# Patient Record
Sex: FEMALE | Race: WHITE | NOT HISPANIC OR LATINO | Employment: OTHER | URBAN - METROPOLITAN AREA
[De-identification: names, ages, dates, MRNs, and addresses within clinical notes are randomized per-mention and may not be internally consistent; named-entity substitution may affect disease eponyms.]

---

## 2017-05-23 DIAGNOSIS — D64.9 ANEMIA: ICD-10-CM

## 2017-05-23 DIAGNOSIS — D50.9 IRON DEFICIENCY ANEMIA: ICD-10-CM

## 2017-05-23 DIAGNOSIS — E53.8 DEFICIENCY OF OTHER SPECIFIED B GROUP VITAMINS: ICD-10-CM

## 2017-05-23 DIAGNOSIS — M35.9 SYSTEMIC INVOLVEMENT OF CONNECTIVE TISSUE (HCC): ICD-10-CM

## 2017-05-23 DIAGNOSIS — R74.8 ABNORMAL LEVELS OF OTHER SERUM ENZYMES: ICD-10-CM

## 2017-05-28 ENCOUNTER — HOSPITAL ENCOUNTER (EMERGENCY)
Facility: HOSPITAL | Age: 47
Discharge: HOME | End: 2017-05-28
Attending: EMERGENCY MEDICINE | Admitting: EMERGENCY MEDICINE
Payer: MEDICARE

## 2017-05-28 ENCOUNTER — GENERIC CONVERSION - ENCOUNTER (OUTPATIENT)
Dept: OTHER | Facility: OTHER | Age: 47
End: 2017-05-28

## 2017-05-28 VITALS
OXYGEN SATURATION: 97 % | WEIGHT: 205 LBS | SYSTOLIC BLOOD PRESSURE: 104 MMHG | DIASTOLIC BLOOD PRESSURE: 57 MMHG | RESPIRATION RATE: 18 BRPM | HEART RATE: 82 BPM | TEMPERATURE: 97.5 F

## 2017-05-28 DIAGNOSIS — R55 NEUROCARDIOGENIC SYNCOPE: ICD-10-CM

## 2017-05-28 DIAGNOSIS — R51.9 HEADACHE: Primary | ICD-10-CM

## 2017-05-28 DIAGNOSIS — R11.0 NAUSEA: ICD-10-CM

## 2017-05-28 LAB
ANION GAP BLD CALC-SCNC: 19 MMOL/L (ref 4–13)
ANION GAP SERPL CALCULATED.3IONS-SCNC: 9 MMOL/L (ref 4–13)
ATRIAL RATE: 375 BPM
ATRIAL RATE: 75 BPM
BACTERIA UR QL AUTO: ABNORMAL /HPF
BASOPHILS # BLD AUTO: 0.02 THOUSANDS/ΜL (ref 0–0.1)
BASOPHILS NFR BLD AUTO: 0 % (ref 0–1)
BILIRUB UR QL STRIP: NEGATIVE
BUN BLD-MCNC: 13 MG/DL (ref 5–25)
BUN SERPL-MCNC: 13 MG/DL (ref 5–25)
CA-I BLD-SCNC: 1.08 MMOL/L (ref 1.12–1.32)
CALCIUM SERPL-MCNC: 8.8 MG/DL (ref 8.3–10.1)
CHLORIDE BLD-SCNC: 102 MMOL/L (ref 100–108)
CHLORIDE SERPL-SCNC: 103 MMOL/L (ref 100–108)
CLARITY UR: CLEAR
CO2 SERPL-SCNC: 25 MMOL/L (ref 21–32)
COLOR UR: YELLOW
CREAT BLD-MCNC: 1 MG/DL (ref 0.6–1.3)
CREAT SERPL-MCNC: 1.12 MG/DL (ref 0.6–1.3)
EOSINOPHIL # BLD AUTO: 0.06 THOUSAND/ΜL (ref 0–0.61)
EOSINOPHIL NFR BLD AUTO: 1 % (ref 0–6)
ERYTHROCYTE [DISTWIDTH] IN BLOOD BY AUTOMATED COUNT: 14.6 % (ref 11.6–15.1)
GFR SERPL CREATININE-BSD FRML MDRD: 51.7 ML/MIN/1.73SQ M
GFR SERPL CREATININE-BSD FRML MDRD: 58.9 ML/MIN/1.73SQ M
GLUCOSE SERPL-MCNC: 151 MG/DL (ref 65–140)
GLUCOSE SERPL-MCNC: 154 MG/DL (ref 65–140)
GLUCOSE UR STRIP-MCNC: NEGATIVE MG/DL
HCT VFR BLD AUTO: 35.2 % (ref 34.8–46.1)
HCT VFR BLD CALC: 36 % (ref 34.8–46.1)
HGB BLD-MCNC: 11.5 G/DL (ref 11.5–15.4)
HGB BLDA-MCNC: 12.2 G/DL (ref 11.5–15.4)
HGB UR QL STRIP.AUTO: NEGATIVE
HYALINE CASTS #/AREA URNS LPF: ABNORMAL /LPF
KETONES UR STRIP-MCNC: NEGATIVE MG/DL
LEUKOCYTE ESTERASE UR QL STRIP: ABNORMAL
LYMPHOCYTES # BLD AUTO: 0.98 THOUSANDS/ΜL (ref 0.6–4.47)
LYMPHOCYTES NFR BLD AUTO: 16 % (ref 14–44)
MCH RBC QN AUTO: 27.9 PG (ref 26.8–34.3)
MCHC RBC AUTO-ENTMCNC: 32.7 G/DL (ref 31.4–37.4)
MCV RBC AUTO: 85 FL (ref 82–98)
MONOCYTES # BLD AUTO: 0.43 THOUSAND/ΜL (ref 0.17–1.22)
MONOCYTES NFR BLD AUTO: 7 % (ref 4–12)
NEUTROPHILS # BLD AUTO: 4.79 THOUSANDS/ΜL (ref 1.85–7.62)
NEUTS SEG NFR BLD AUTO: 76 % (ref 43–75)
NITRITE UR QL STRIP: NEGATIVE
NON-SQ EPI CELLS URNS QL MICRO: ABNORMAL /HPF
NRBC BLD AUTO-RTO: 0 /100 WBCS
P AXIS: 81 DEGREES
PCO2 BLD: 22 MMOL/L (ref 21–32)
PH UR STRIP.AUTO: 6 [PH] (ref 4.5–8)
PLATELET # BLD AUTO: 311 THOUSANDS/UL (ref 149–390)
PMV BLD AUTO: 10.2 FL (ref 8.9–12.7)
POTASSIUM BLD-SCNC: 4.1 MMOL/L (ref 3.5–5.3)
POTASSIUM SERPL-SCNC: 4 MMOL/L (ref 3.5–5.3)
PR INTERVAL: 148 MS
PROT UR STRIP-MCNC: NEGATIVE MG/DL
QRS AXIS: 30 DEGREES
QRS AXIS: 9 DEGREES
QRSD INTERVAL: 74 MS
QRSD INTERVAL: 80 MS
QT INTERVAL: 342 MS
QT INTERVAL: 416 MS
QTC INTERVAL: 401 MS
QTC INTERVAL: 464 MS
RBC # BLD AUTO: 4.12 MILLION/UL (ref 3.81–5.12)
RBC #/AREA URNS AUTO: ABNORMAL /HPF
SODIUM BLD-SCNC: 138 MMOL/L (ref 136–145)
SODIUM SERPL-SCNC: 137 MMOL/L (ref 136–145)
SP GR UR STRIP.AUTO: 1.01 (ref 1–1.03)
SPECIMEN SOURCE: ABNORMAL
SPECIMEN SOURCE: NORMAL
T WAVE AXIS: 39 DEGREES
T WAVE AXIS: 75 DEGREES
TROPONIN I BLD-MCNC: 0 NG/ML (ref 0–0.08)
UROBILINOGEN UR QL STRIP.AUTO: 0.2 E.U./DL
VENTRICULAR RATE: 75 BPM
VENTRICULAR RATE: 83 BPM
WBC # BLD AUTO: 6.29 THOUSAND/UL (ref 4.31–10.16)
WBC #/AREA URNS AUTO: ABNORMAL /HPF

## 2017-05-28 PROCEDURE — 99284 EMERGENCY DEPT VISIT MOD MDM: CPT

## 2017-05-28 PROCEDURE — 96374 THER/PROPH/DIAG INJ IV PUSH: CPT

## 2017-05-28 PROCEDURE — 84484 ASSAY OF TROPONIN QUANT: CPT

## 2017-05-28 PROCEDURE — 81001 URINALYSIS AUTO W/SCOPE: CPT

## 2017-05-28 PROCEDURE — 80047 BASIC METABLC PNL IONIZED CA: CPT

## 2017-05-28 PROCEDURE — 93005 ELECTROCARDIOGRAM TRACING: CPT

## 2017-05-28 PROCEDURE — 36415 COLL VENOUS BLD VENIPUNCTURE: CPT | Performed by: EMERGENCY MEDICINE

## 2017-05-28 PROCEDURE — 85014 HEMATOCRIT: CPT

## 2017-05-28 PROCEDURE — 85025 COMPLETE CBC W/AUTO DIFF WBC: CPT | Performed by: EMERGENCY MEDICINE

## 2017-05-28 PROCEDURE — 96361 HYDRATE IV INFUSION ADD-ON: CPT

## 2017-05-28 PROCEDURE — 80048 BASIC METABOLIC PNL TOTAL CA: CPT | Performed by: EMERGENCY MEDICINE

## 2017-05-28 PROCEDURE — 96375 TX/PRO/DX INJ NEW DRUG ADDON: CPT

## 2017-05-28 RX ORDER — DIPHENHYDRAMINE HCL 12.5MG/5ML
25 LIQUID (ML) ORAL ONCE
Status: COMPLETED | OUTPATIENT
Start: 2017-05-28 | End: 2017-05-28

## 2017-05-28 RX ORDER — ONDANSETRON 2 MG/ML
4 INJECTION INTRAMUSCULAR; INTRAVENOUS ONCE
Status: COMPLETED | OUTPATIENT
Start: 2017-05-28 | End: 2017-05-28

## 2017-05-28 RX ORDER — ACETAMINOPHEN 325 MG/1
650 TABLET ORAL ONCE
Status: COMPLETED | OUTPATIENT
Start: 2017-05-28 | End: 2017-05-28

## 2017-05-28 RX ORDER — GARLIC 180 MG
40 TABLET, DELAYED RELEASE (ENTERIC COATED) ORAL DAILY
COMMUNITY
End: 2018-05-25 | Stop reason: HOSPADM

## 2017-05-28 RX ORDER — METOCLOPRAMIDE HYDROCHLORIDE 5 MG/ML
10 INJECTION INTRAMUSCULAR; INTRAVENOUS ONCE
Status: COMPLETED | OUTPATIENT
Start: 2017-05-28 | End: 2017-05-28

## 2017-05-28 RX ORDER — CYCLOBENZAPRINE HCL 10 MG
10 TABLET ORAL 3 TIMES DAILY PRN
COMMUNITY
End: 2018-05-08 | Stop reason: ALTCHOICE

## 2017-05-28 RX ORDER — KETOROLAC TROMETHAMINE 30 MG/ML
15 INJECTION, SOLUTION INTRAMUSCULAR; INTRAVENOUS ONCE
Status: COMPLETED | OUTPATIENT
Start: 2017-05-28 | End: 2017-05-28

## 2017-05-28 RX ADMIN — ONDANSETRON 4 MG: 2 INJECTION INTRAMUSCULAR; INTRAVENOUS at 00:46

## 2017-05-28 RX ADMIN — KETOROLAC TROMETHAMINE 15 MG: 30 INJECTION, SOLUTION INTRAMUSCULAR at 01:28

## 2017-05-28 RX ADMIN — SODIUM CHLORIDE 1000 ML: 0.9 INJECTION, SOLUTION INTRAVENOUS at 00:45

## 2017-05-28 RX ADMIN — METOCLOPRAMIDE 10 MG: 5 INJECTION, SOLUTION INTRAMUSCULAR; INTRAVENOUS at 01:28

## 2017-05-28 RX ADMIN — DIPHENHYDRAMINE HYDROCHLORIDE 25 MG: 12.5 SOLUTION ORAL at 01:28

## 2017-05-28 RX ADMIN — ACETAMINOPHEN 650 MG: 325 TABLET, FILM COATED ORAL at 00:46

## 2017-05-30 ENCOUNTER — ALLSCRIPTS OFFICE VISIT (OUTPATIENT)
Dept: OTHER | Facility: OTHER | Age: 47
End: 2017-05-30

## 2017-06-08 ENCOUNTER — HOSPITAL ENCOUNTER (OUTPATIENT)
Dept: INFUSION CENTER | Facility: HOSPITAL | Age: 47
Discharge: HOME | End: 2017-06-08
Payer: MEDICARE

## 2017-06-08 VITALS
SYSTOLIC BLOOD PRESSURE: 112 MMHG | TEMPERATURE: 99.1 F | WEIGHT: 229.5 LBS | RESPIRATION RATE: 20 BRPM | HEART RATE: 72 BPM | DIASTOLIC BLOOD PRESSURE: 64 MMHG | BODY MASS INDEX: 40.66 KG/M2 | HEIGHT: 63 IN

## 2017-06-08 PROCEDURE — 96365 THER/PROPH/DIAG IV INF INIT: CPT

## 2017-06-08 PROCEDURE — 96367 TX/PROPH/DG ADDL SEQ IV INF: CPT

## 2017-06-08 RX ORDER — SODIUM CHLORIDE 9 MG/ML
20 INJECTION, SOLUTION INTRAVENOUS ONCE
Status: COMPLETED | OUTPATIENT
Start: 2017-06-08 | End: 2017-06-08

## 2017-06-08 RX ADMIN — SODIUM CHLORIDE 20 ML/HR: 0.9 INJECTION, SOLUTION INTRAVENOUS at 14:25

## 2017-06-08 RX ADMIN — DIPHENHYDRAMINE HYDROCHLORIDE 25 MG: 50 INJECTION, SOLUTION INTRAMUSCULAR; INTRAVENOUS at 14:25

## 2017-06-08 RX ADMIN — IRON SUCROSE 200 MG: 20 INJECTION, SOLUTION INTRAVENOUS at 14:54

## 2017-06-14 RX ORDER — SODIUM CHLORIDE 9 MG/ML
20 INJECTION, SOLUTION INTRAVENOUS ONCE
Status: COMPLETED | OUTPATIENT
Start: 2017-06-15 | End: 2017-06-15

## 2017-06-15 ENCOUNTER — HOSPITAL ENCOUNTER (OUTPATIENT)
Dept: INFUSION CENTER | Facility: HOSPITAL | Age: 47
Discharge: HOME | End: 2017-06-15
Payer: MEDICARE

## 2017-06-15 VITALS
HEART RATE: 82 BPM | SYSTOLIC BLOOD PRESSURE: 135 MMHG | RESPIRATION RATE: 16 BRPM | TEMPERATURE: 98.3 F | DIASTOLIC BLOOD PRESSURE: 82 MMHG

## 2017-06-15 PROCEDURE — 96365 THER/PROPH/DIAG IV INF INIT: CPT

## 2017-06-15 PROCEDURE — 96367 TX/PROPH/DG ADDL SEQ IV INF: CPT

## 2017-06-15 RX ADMIN — DIPHENHYDRAMINE HYDROCHLORIDE 25 MG: 50 INJECTION, SOLUTION INTRAMUSCULAR; INTRAVENOUS at 14:23

## 2017-06-15 RX ADMIN — SODIUM CHLORIDE 20 ML/HR: 0.9 INJECTION, SOLUTION INTRAVENOUS at 14:23

## 2017-06-15 RX ADMIN — IRON SUCROSE 200 MG: 20 INJECTION, SOLUTION INTRAVENOUS at 14:54

## 2017-08-02 ENCOUNTER — ALLSCRIPTS OFFICE VISIT (OUTPATIENT)
Dept: OTHER | Facility: OTHER | Age: 47
End: 2017-08-02

## 2017-08-02 ENCOUNTER — TRANSCRIBE ORDERS (OUTPATIENT)
Dept: ADMINISTRATIVE | Facility: HOSPITAL | Age: 47
End: 2017-08-02

## 2017-08-02 DIAGNOSIS — M25.562 ACUTE PAIN OF LEFT KNEE: Primary | ICD-10-CM

## 2017-08-07 ENCOUNTER — HOSPITAL ENCOUNTER (OUTPATIENT)
Dept: RADIOLOGY | Facility: HOSPITAL | Age: 47
Discharge: HOME | End: 2017-08-07
Attending: ORTHOPAEDIC SURGERY
Payer: MEDICARE

## 2017-08-07 DIAGNOSIS — M25.562 ACUTE PAIN OF LEFT KNEE: ICD-10-CM

## 2017-08-07 PROCEDURE — 73721 MRI JNT OF LWR EXTRE W/O DYE: CPT

## 2017-08-09 ENCOUNTER — ALLSCRIPTS OFFICE VISIT (OUTPATIENT)
Dept: OTHER | Facility: OTHER | Age: 47
End: 2017-08-09

## 2017-08-09 ENCOUNTER — APPOINTMENT (OUTPATIENT)
Dept: LAB | Facility: HOSPITAL | Age: 47
End: 2017-08-09
Attending: ORTHOPAEDIC SURGERY
Payer: MEDICARE

## 2017-08-09 ENCOUNTER — TRANSCRIBE ORDERS (OUTPATIENT)
Dept: ADMINISTRATIVE | Facility: HOSPITAL | Age: 47
End: 2017-08-09

## 2017-08-09 DIAGNOSIS — M25.562 LEFT KNEE PAIN, UNSPECIFIED CHRONICITY: ICD-10-CM

## 2017-08-09 DIAGNOSIS — M25.562 LEFT KNEE PAIN, UNSPECIFIED CHRONICITY: Primary | ICD-10-CM

## 2017-08-09 LAB
ANION GAP SERPL CALCULATED.3IONS-SCNC: 8 MMOL/L (ref 4–13)
BASOPHILS # BLD AUTO: 0.02 THOUSANDS/ΜL (ref 0–0.1)
BASOPHILS NFR BLD AUTO: 1 % (ref 0–1)
BUN SERPL-MCNC: 12 MG/DL (ref 5–25)
CALCIUM SERPL-MCNC: 9.1 MG/DL (ref 8.3–10.1)
CHLORIDE SERPL-SCNC: 104 MMOL/L (ref 100–108)
CO2 SERPL-SCNC: 30 MMOL/L (ref 21–32)
CREAT SERPL-MCNC: 1 MG/DL (ref 0.6–1.3)
EOSINOPHIL # BLD AUTO: 0.1 THOUSAND/ΜL (ref 0–0.61)
EOSINOPHIL NFR BLD AUTO: 2 % (ref 0–6)
ERYTHROCYTE [DISTWIDTH] IN BLOOD BY AUTOMATED COUNT: 19.8 % (ref 11.6–15.1)
GFR SERPL CREATININE-BSD FRML MDRD: 66 ML/MIN/1.73SQ M
GLUCOSE P FAST SERPL-MCNC: 93 MG/DL (ref 65–99)
HCT VFR BLD AUTO: 42 % (ref 34.8–46.1)
HGB BLD-MCNC: 13.8 G/DL (ref 11.5–15.4)
LYMPHOCYTES # BLD AUTO: 1.18 THOUSANDS/ΜL (ref 0.6–4.47)
LYMPHOCYTES NFR BLD AUTO: 27 % (ref 14–44)
MCH RBC QN AUTO: 28.2 PG (ref 26.8–34.3)
MCHC RBC AUTO-ENTMCNC: 32.9 G/DL (ref 31.4–37.4)
MCV RBC AUTO: 86 FL (ref 82–98)
MONOCYTES # BLD AUTO: 0.27 THOUSAND/ΜL (ref 0.17–1.22)
MONOCYTES NFR BLD AUTO: 6 % (ref 4–12)
NEUTROPHILS # BLD AUTO: 2.74 THOUSANDS/ΜL (ref 1.85–7.62)
NEUTS SEG NFR BLD AUTO: 64 % (ref 43–75)
PLATELET # BLD AUTO: 331 THOUSANDS/UL (ref 149–390)
PMV BLD AUTO: 11 FL (ref 8.9–12.7)
POTASSIUM SERPL-SCNC: 3.9 MMOL/L (ref 3.5–5.3)
RBC # BLD AUTO: 4.9 MILLION/UL (ref 3.81–5.12)
SODIUM SERPL-SCNC: 142 MMOL/L (ref 136–145)
WBC # BLD AUTO: 4.31 THOUSAND/UL (ref 4.31–10.16)

## 2017-08-09 PROCEDURE — 80048 BASIC METABOLIC PNL TOTAL CA: CPT

## 2017-08-09 PROCEDURE — 85025 COMPLETE CBC W/AUTO DIFF WBC: CPT

## 2017-08-09 PROCEDURE — 36415 COLL VENOUS BLD VENIPUNCTURE: CPT

## 2017-08-16 RX ORDER — FUROSEMIDE 20 MG/1
20 TABLET ORAL DAILY
COMMUNITY
End: 2018-05-08 | Stop reason: ALTCHOICE

## 2017-08-18 ENCOUNTER — ANESTHESIA EVENT (OUTPATIENT)
Dept: PERIOP | Facility: HOSPITAL | Age: 47
End: 2017-08-18
Payer: MEDICARE

## 2017-08-18 ENCOUNTER — ANESTHESIA (OUTPATIENT)
Dept: PERIOP | Facility: HOSPITAL | Age: 47
End: 2017-08-18
Payer: MEDICARE

## 2017-08-18 ENCOUNTER — HOSPITAL ENCOUNTER (OUTPATIENT)
Facility: HOSPITAL | Age: 47
Setting detail: OUTPATIENT SURGERY
Discharge: HOME | End: 2017-08-18
Attending: ORTHOPAEDIC SURGERY | Admitting: ORTHOPAEDIC SURGERY
Payer: MEDICARE

## 2017-08-18 VITALS
RESPIRATION RATE: 20 BRPM | WEIGHT: 229 LBS | SYSTOLIC BLOOD PRESSURE: 125 MMHG | HEART RATE: 75 BPM | DIASTOLIC BLOOD PRESSURE: 71 MMHG | OXYGEN SATURATION: 97 % | HEIGHT: 63 IN | TEMPERATURE: 97.9 F | BODY MASS INDEX: 40.57 KG/M2

## 2017-08-18 PROBLEM — M67.52 SYNOVIAL PLICA OF LEFT KNEE: Status: ACTIVE | Noted: 2017-08-18

## 2017-08-18 RX ORDER — ACETAMINOPHEN 325 MG/1
650 TABLET ORAL EVERY 6 HOURS PRN
Status: DISCONTINUED | OUTPATIENT
Start: 2017-08-18 | End: 2017-08-18 | Stop reason: HOSPADM

## 2017-08-18 RX ORDER — BUPIVACAINE HYDROCHLORIDE AND EPINEPHRINE 5; 5 MG/ML; UG/ML
INJECTION, SOLUTION EPIDURAL; INTRACAUDAL; PERINEURAL AS NEEDED
Status: DISCONTINUED | OUTPATIENT
Start: 2017-08-18 | End: 2017-08-18 | Stop reason: HOSPADM

## 2017-08-18 RX ORDER — CEFAZOLIN SODIUM 1 G/3ML
INJECTION, POWDER, FOR SOLUTION INTRAMUSCULAR; INTRAVENOUS AS NEEDED
Status: DISCONTINUED | OUTPATIENT
Start: 2017-08-18 | End: 2017-08-18

## 2017-08-18 RX ORDER — GLYCOPYRROLATE 0.2 MG/ML
INJECTION INTRAMUSCULAR; INTRAVENOUS AS NEEDED
Status: DISCONTINUED | OUTPATIENT
Start: 2017-08-18 | End: 2017-08-18 | Stop reason: SURG

## 2017-08-18 RX ORDER — PROPOFOL 10 MG/ML
INJECTION, EMULSION INTRAVENOUS AS NEEDED
Status: DISCONTINUED | OUTPATIENT
Start: 2017-08-18 | End: 2017-08-18 | Stop reason: SURG

## 2017-08-18 RX ORDER — FENTANYL CITRATE 50 UG/ML
INJECTION, SOLUTION INTRAMUSCULAR; INTRAVENOUS AS NEEDED
Status: DISCONTINUED | OUTPATIENT
Start: 2017-08-18 | End: 2017-08-18 | Stop reason: SURG

## 2017-08-18 RX ORDER — OXYCODONE HYDROCHLORIDE AND ACETAMINOPHEN 5; 325 MG/1; MG/1
2 TABLET ORAL EVERY 4 HOURS PRN
Status: DISCONTINUED | OUTPATIENT
Start: 2017-08-18 | End: 2017-08-18 | Stop reason: HOSPADM

## 2017-08-18 RX ORDER — SODIUM CHLORIDE, SODIUM LACTATE, POTASSIUM CHLORIDE, CALCIUM CHLORIDE 600; 310; 30; 20 MG/100ML; MG/100ML; MG/100ML; MG/100ML
20 INJECTION, SOLUTION INTRAVENOUS CONTINUOUS
Status: DISCONTINUED | OUTPATIENT
Start: 2017-08-18 | End: 2017-08-18 | Stop reason: HOSPADM

## 2017-08-18 RX ORDER — ONDANSETRON 2 MG/ML
4 INJECTION INTRAMUSCULAR; INTRAVENOUS EVERY 6 HOURS PRN
Status: DISCONTINUED | OUTPATIENT
Start: 2017-08-18 | End: 2017-08-18 | Stop reason: HOSPADM

## 2017-08-18 RX ORDER — DEXAMETHASONE SODIUM PHOSPHATE 4 MG/ML
INJECTION, SOLUTION INTRA-ARTICULAR; INTRALESIONAL; INTRAMUSCULAR; INTRAVENOUS; SOFT TISSUE AS NEEDED
Status: DISCONTINUED | OUTPATIENT
Start: 2017-08-18 | End: 2017-08-18 | Stop reason: SURG

## 2017-08-18 RX ORDER — MIDAZOLAM HYDROCHLORIDE 1 MG/ML
INJECTION INTRAMUSCULAR; INTRAVENOUS AS NEEDED
Status: DISCONTINUED | OUTPATIENT
Start: 2017-08-18 | End: 2017-08-18 | Stop reason: SURG

## 2017-08-18 RX ORDER — OXYCODONE HYDROCHLORIDE AND ACETAMINOPHEN 5; 325 MG/1; MG/1
1 TABLET ORAL EVERY 4 HOURS PRN
Status: DISCONTINUED | OUTPATIENT
Start: 2017-08-18 | End: 2017-08-18 | Stop reason: HOSPADM

## 2017-08-18 RX ORDER — KETOROLAC TROMETHAMINE 30 MG/ML
INJECTION, SOLUTION INTRAMUSCULAR; INTRAVENOUS AS NEEDED
Status: DISCONTINUED | OUTPATIENT
Start: 2017-08-18 | End: 2017-08-18 | Stop reason: SURG

## 2017-08-18 RX ORDER — ONDANSETRON 2 MG/ML
INJECTION INTRAMUSCULAR; INTRAVENOUS AS NEEDED
Status: DISCONTINUED | OUTPATIENT
Start: 2017-08-18 | End: 2017-08-18 | Stop reason: SURG

## 2017-08-18 RX ORDER — OXYCODONE HYDROCHLORIDE AND ACETAMINOPHEN 5; 325 MG/1; MG/1
TABLET ORAL
Qty: 30 TABLET | Refills: 0 | Status: SHIPPED | OUTPATIENT
Start: 2017-08-18 | End: 2018-05-08 | Stop reason: ALTCHOICE

## 2017-08-18 RX ORDER — FENTANYL CITRATE/PF 50 MCG/ML
25 SYRINGE (ML) INJECTION
Status: COMPLETED | OUTPATIENT
Start: 2017-08-18 | End: 2017-08-18

## 2017-08-18 RX ADMIN — FENTANYL CITRATE 25 MCG: 50 INJECTION, SOLUTION INTRAMUSCULAR; INTRAVENOUS at 09:55

## 2017-08-18 RX ADMIN — FENTANYL CITRATE 25 MCG: 50 INJECTION, SOLUTION INTRAMUSCULAR; INTRAVENOUS at 09:35

## 2017-08-18 RX ADMIN — DEXAMETHASONE SODIUM PHOSPHATE 8 MG: 4 INJECTION, SOLUTION INTRAMUSCULAR; INTRAVENOUS at 09:01

## 2017-08-18 RX ADMIN — MIDAZOLAM HYDROCHLORIDE 2 MG: 1 INJECTION, SOLUTION INTRAMUSCULAR; INTRAVENOUS at 08:30

## 2017-08-18 RX ADMIN — ONDANSETRON 4 MG: 2 INJECTION INTRAMUSCULAR; INTRAVENOUS at 09:00

## 2017-08-18 RX ADMIN — CEFAZOLIN SODIUM 1000 MG: 1 SOLUTION INTRAVENOUS at 08:34

## 2017-08-18 RX ADMIN — FENTANYL CITRATE 25 MCG: 50 INJECTION, SOLUTION INTRAMUSCULAR; INTRAVENOUS at 09:29

## 2017-08-18 RX ADMIN — SODIUM CHLORIDE, SODIUM LACTATE, POTASSIUM CHLORIDE, AND CALCIUM CHLORIDE 20 ML/HR: .6; .31; .03; .02 INJECTION, SOLUTION INTRAVENOUS at 07:35

## 2017-08-18 RX ADMIN — PROPOFOL 150 MG: 10 INJECTION, EMULSION INTRAVENOUS at 08:35

## 2017-08-18 RX ADMIN — GLYCOPYRROLATE 0.2 MG: 0.2 INJECTION, SOLUTION INTRAMUSCULAR; INTRAVENOUS at 08:30

## 2017-08-18 RX ADMIN — KETOROLAC TROMETHAMINE 30 MG: 30 INJECTION, SOLUTION INTRAMUSCULAR at 09:07

## 2017-08-18 RX ADMIN — FENTANYL CITRATE 25 MCG: 50 INJECTION, SOLUTION INTRAMUSCULAR; INTRAVENOUS at 09:43

## 2017-08-18 RX ADMIN — FENTANYL CITRATE 50 MCG: 50 INJECTION, SOLUTION INTRAMUSCULAR; INTRAVENOUS at 08:35

## 2017-08-18 RX ADMIN — OXYCODONE HYDROCHLORIDE AND ACETAMINOPHEN 2 TABLET: 5; 325 TABLET ORAL at 10:28

## 2017-08-28 ENCOUNTER — ALLSCRIPTS OFFICE VISIT (OUTPATIENT)
Dept: OTHER | Facility: OTHER | Age: 47
End: 2017-08-28

## 2018-01-12 VITALS
DIASTOLIC BLOOD PRESSURE: 84 MMHG | WEIGHT: 205 LBS | SYSTOLIC BLOOD PRESSURE: 122 MMHG | HEIGHT: 63 IN | BODY MASS INDEX: 36.32 KG/M2 | HEART RATE: 89 BPM

## 2018-01-13 VITALS
SYSTOLIC BLOOD PRESSURE: 120 MMHG | BODY MASS INDEX: 36.32 KG/M2 | WEIGHT: 205 LBS | HEIGHT: 63 IN | HEART RATE: 82 BPM | DIASTOLIC BLOOD PRESSURE: 78 MMHG

## 2018-01-13 VITALS
WEIGHT: 205 LBS | BODY MASS INDEX: 36.32 KG/M2 | DIASTOLIC BLOOD PRESSURE: 85 MMHG | HEART RATE: 77 BPM | SYSTOLIC BLOOD PRESSURE: 124 MMHG | HEIGHT: 63 IN

## 2018-01-14 VITALS
TEMPERATURE: 97 F | HEIGHT: 64 IN | RESPIRATION RATE: 18 BRPM | WEIGHT: 233 LBS | DIASTOLIC BLOOD PRESSURE: 90 MMHG | BODY MASS INDEX: 39.78 KG/M2 | OXYGEN SATURATION: 93 % | HEART RATE: 86 BPM | SYSTOLIC BLOOD PRESSURE: 130 MMHG

## 2018-05-01 ENCOUNTER — OFFICE VISIT (OUTPATIENT)
Dept: HEMATOLOGY ONCOLOGY | Facility: CLINIC | Age: 48
End: 2018-05-01
Payer: MEDICARE

## 2018-05-01 VITALS
HEIGHT: 63 IN | TEMPERATURE: 98.5 F | HEART RATE: 85 BPM | SYSTOLIC BLOOD PRESSURE: 140 MMHG | DIASTOLIC BLOOD PRESSURE: 90 MMHG | WEIGHT: 226 LBS | BODY MASS INDEX: 40.04 KG/M2 | OXYGEN SATURATION: 98 % | RESPIRATION RATE: 18 BRPM

## 2018-05-01 DIAGNOSIS — M35.9 SYSTEMIC INVOLVEMENT OF CONNECTIVE TISSUE (HCC): ICD-10-CM

## 2018-05-01 DIAGNOSIS — E53.8 DEFICIENCY OF OTHER SPECIFIED B GROUP VITAMINS: ICD-10-CM

## 2018-05-01 DIAGNOSIS — R79.0 LOW IRON STORES: Primary | ICD-10-CM

## 2018-05-01 DIAGNOSIS — R74.8 ABNORMAL LEVELS OF OTHER SERUM ENZYMES: ICD-10-CM

## 2018-05-01 DIAGNOSIS — R74.8 ELEVATED ALKALINE PHOSPHATASE LEVEL: ICD-10-CM

## 2018-05-01 DIAGNOSIS — D50.9 IRON DEFICIENCY ANEMIA: ICD-10-CM

## 2018-05-01 PROCEDURE — 99214 OFFICE O/P EST MOD 30 MIN: CPT | Performed by: INTERNAL MEDICINE

## 2018-05-01 NOTE — PROGRESS NOTES
May 1, 2018    Lesli Spicer    She notes persistent fatigue and headache  There is discomfort of the neck, shoulders, hips and knees suboptimally controlled with duloxetine  She has been taking iron sulfate 325 mg b i d  which has been well tolerated  She stopped taking vitamin B12 supplements on her own 6 months ago  She takes vitamin-D 2000 units b i d      ROS:  Bowel habits are chronically irregular with intermittent loose bowel movements and abdominal cramps  Hematology/Oncology History:    Anemia  There is history of iron deficiency presumably related to iron malabsorption post gastric bypass surgery done in June 2004  In August 2010 vitamin B12 deficieny was recognized presumably a result of malabsorption related to the gastric bypass surgery  Iron dextrose 1300 mg in May 2006, iron sucrose 200 mg and vitamin B12 1000 mcg weekly x5 August to September 2010  Iron sucrose 200 mg IV weekly for 5 weeks and vitamin B12 1000 mcg IM October to November 2014  Vitamin B12 1000 mcg orally from 6738-1182  Iron sucrose 200 mg weekly x2 in June 2017  EGD August 2, 2012 normal, status post gastric bypass  Colonoscopy June 14, 2012 there is moderate melanosis coli, otherwise normal, no polyps seen  Physical Examination:    Blood pressure 140/90, pulse 85, temperature 98 5 °F (36 9 °C), temperature source Tympanic, resp  rate 18, height 5' 3" (1 6 m), weight 103 kg (226 lb), SpO2 98 %  Body surface area is 2 04 meters squared  She appears well  EOMI  No lymphadenopathy of the neck or axilla  (Breast examination was declined today ) Lungs are clear bilaterally  Heart has regular rate and rhythm  No hepatic or splenic enlargement  No inguinal lymphadenopathy  No lower extremity edema  Gait and station are normal, neurological is grossly intact  Oriented x3, normal mood and affect      ECOG 1    Laboratory:    From April 14, 2018:  WBC 3 1 with ANC 1 7, hemoglobin 11 9, platelets 792, creatinine 1 10, alk-phos 177 (), AST 19, ALT 16, bili 0 3, serum iron is 35 with saturation 9%, ferritin 8, vitamin B12> 2000, folate 13 1, 25 hydroxy vitamin-D is 27 4    Assessment/Plan:    Anemia has not recurred, however there is laboratory evidence of deficient iron stores  The patient would like to receive further iron sucrose noting that she prefers to avoid recurrent anemia in the near future as would be expected  Accordingly, she is to receive iron sucrose 200 mg IV weekly x2  Iron sulfate 325 mg twice daily is to be continued and she is to remain off vitamin B12 supplements  In view of progressive elevation in the alkaline phosphatase level of uncertain etiology, alkaline phosphatase isoenzymes are to be obtained  If there is elevated bone enzyme, then nuclear bone scan would be recommended  If there is elevated liver enzyme then ultrasound liver would be recommended  In view of mild azotemia ultrasound of the kidneys is to be obtained as well as urinalysis  In the case of obstructive uropathy then urology consultation would be recommended  If the kidneys appear anatomically normal then nephrology consultation would be recommended  The patient is aware to seek medical attention for shortness of breath, lightheadedness, progressive joint pain, excessive fatigue or if other new problems arise  Otherwise I plan to see her again in one year  Jacque Puente

## 2018-05-01 NOTE — PATIENT INSTRUCTIONS
The patient is aware to seek medical attention for shortness of breath, lightheadedness, progressive joint pain, excessive fatigue or if other new problems arise

## 2018-05-07 RX ORDER — SODIUM CHLORIDE 9 MG/ML
20 INJECTION, SOLUTION INTRAVENOUS ONCE
Status: COMPLETED | OUTPATIENT
Start: 2018-05-08 | End: 2018-05-08

## 2018-05-08 ENCOUNTER — HOSPITAL ENCOUNTER (OUTPATIENT)
Dept: INFUSION CENTER | Facility: HOSPITAL | Age: 48
Discharge: HOME | End: 2018-05-08
Payer: MEDICARE

## 2018-05-08 VITALS
TEMPERATURE: 97.8 F | SYSTOLIC BLOOD PRESSURE: 140 MMHG | HEART RATE: 76 BPM | RESPIRATION RATE: 20 BRPM | DIASTOLIC BLOOD PRESSURE: 84 MMHG

## 2018-05-08 PROCEDURE — 96367 TX/PROPH/DG ADDL SEQ IV INF: CPT

## 2018-05-08 PROCEDURE — 96365 THER/PROPH/DIAG IV INF INIT: CPT

## 2018-05-08 RX ADMIN — IRON SUCROSE 200 MG: 20 INJECTION, SOLUTION INTRAVENOUS at 14:30

## 2018-05-08 RX ADMIN — DIPHENHYDRAMINE HYDROCHLORIDE 25 MG: 50 INJECTION, SOLUTION INTRAMUSCULAR; INTRAVENOUS at 14:12

## 2018-05-08 RX ADMIN — SODIUM CHLORIDE 20 ML/HR: 0.9 INJECTION, SOLUTION INTRAVENOUS at 14:12

## 2018-05-14 RX ORDER — SODIUM CHLORIDE 9 MG/ML
20 INJECTION, SOLUTION INTRAVENOUS ONCE
Status: DISCONTINUED | OUTPATIENT
Start: 2018-05-15 | End: 2018-05-15 | Stop reason: SDDI

## 2018-05-15 ENCOUNTER — HOSPITAL ENCOUNTER (OUTPATIENT)
Dept: INFUSION CENTER | Facility: HOSPITAL | Age: 48
Discharge: HOME | End: 2018-05-15

## 2018-05-21 RX ORDER — SODIUM CHLORIDE 9 MG/ML
20 INJECTION, SOLUTION INTRAVENOUS ONCE
Status: COMPLETED | OUTPATIENT
Start: 2018-05-22 | End: 2018-05-22

## 2018-05-22 ENCOUNTER — HOSPITAL ENCOUNTER (OUTPATIENT)
Dept: INFUSION CENTER | Facility: HOSPITAL | Age: 48
Discharge: HOME | DRG: 103 | End: 2018-05-22
Payer: MEDICARE

## 2018-05-22 VITALS
DIASTOLIC BLOOD PRESSURE: 78 MMHG | HEART RATE: 80 BPM | SYSTOLIC BLOOD PRESSURE: 128 MMHG | TEMPERATURE: 98.3 F | RESPIRATION RATE: 16 BRPM

## 2018-05-22 PROCEDURE — 96367 TX/PROPH/DG ADDL SEQ IV INF: CPT

## 2018-05-22 PROCEDURE — 96365 THER/PROPH/DIAG IV INF INIT: CPT

## 2018-05-22 RX ADMIN — SODIUM CHLORIDE 20 ML/HR: 0.9 INJECTION, SOLUTION INTRAVENOUS at 13:41

## 2018-05-22 RX ADMIN — DIPHENHYDRAMINE HYDROCHLORIDE 25 MG: 50 INJECTION, SOLUTION INTRAMUSCULAR; INTRAVENOUS at 13:42

## 2018-05-22 RX ADMIN — IRON SUCROSE 200 MG: 20 INJECTION, SOLUTION INTRAVENOUS at 14:03

## 2018-05-22 NOTE — PLAN OF CARE
Problem: Potential for Falls  Goal: Patient will remain free of falls  INTERVENTIONS:  - Assess patient frequently for physical needs  -  Identify cognitive and physical deficits and behaviors that affect risk of falls    -  Hull fall precautions as indicated by assessment   - Educate patient/family on patient safety including physical limitations  - Instruct patient to call for assistance with activity based on assessment  - Modify environment to reduce risk of injury  - Consider OT/PT consult to assist with strengthening/mobility   Outcome: Progressing

## 2018-05-23 ENCOUNTER — APPOINTMENT (EMERGENCY)
Dept: RADIOLOGY | Facility: HOSPITAL | Age: 48
DRG: 103 | End: 2018-05-23
Payer: MEDICARE

## 2018-05-23 ENCOUNTER — HOSPITAL ENCOUNTER (INPATIENT)
Facility: HOSPITAL | Age: 48
LOS: 1 days | Discharge: HOME | DRG: 103 | End: 2018-05-25
Attending: EMERGENCY MEDICINE | Admitting: HOSPITALIST
Payer: MEDICARE

## 2018-05-23 DIAGNOSIS — R55 SYNCOPE AND COLLAPSE: Primary | ICD-10-CM

## 2018-05-23 DIAGNOSIS — H53.9 VISUAL DISTURBANCE: ICD-10-CM

## 2018-05-23 DIAGNOSIS — R51.9 HEADACHE: ICD-10-CM

## 2018-05-23 LAB
BASOPHILS # BLD AUTO: 0.02 THOUSANDS/ΜL (ref 0–0.1)
BASOPHILS NFR BLD AUTO: 0 % (ref 0–1)
EOSINOPHIL # BLD AUTO: 0.05 THOUSAND/ΜL (ref 0–0.61)
EOSINOPHIL NFR BLD AUTO: 1 % (ref 0–6)
ERYTHROCYTE [DISTWIDTH] IN BLOOD BY AUTOMATED COUNT: 15.6 % (ref 11.6–15.1)
HCT VFR BLD AUTO: 40.5 % (ref 34.8–46.1)
HGB BLD-MCNC: 13.5 G/DL (ref 11.5–15.4)
LYMPHOCYTES # BLD AUTO: 1.18 THOUSANDS/ΜL (ref 0.6–4.47)
LYMPHOCYTES NFR BLD AUTO: 23 % (ref 14–44)
MCH RBC QN AUTO: 29 PG (ref 26.8–34.3)
MCHC RBC AUTO-ENTMCNC: 33.3 G/DL (ref 31.4–37.4)
MCV RBC AUTO: 87 FL (ref 82–98)
MONOCYTES # BLD AUTO: 0.23 THOUSAND/ΜL (ref 0.17–1.22)
MONOCYTES NFR BLD AUTO: 4 % (ref 4–12)
NEUTROPHILS # BLD AUTO: 3.7 THOUSANDS/ΜL (ref 1.85–7.62)
NEUTS SEG NFR BLD AUTO: 71 % (ref 43–75)
NRBC BLD AUTO-RTO: 0 /100 WBCS
PLATELET # BLD AUTO: 329 THOUSANDS/UL (ref 149–390)
PMV BLD AUTO: 11 FL (ref 8.9–12.7)
RBC # BLD AUTO: 4.66 MILLION/UL (ref 3.81–5.12)
WBC # BLD AUTO: 5.19 THOUSAND/UL (ref 4.31–10.16)

## 2018-05-23 PROCEDURE — 36415 COLL VENOUS BLD VENIPUNCTURE: CPT | Performed by: EMERGENCY MEDICINE

## 2018-05-23 PROCEDURE — 84484 ASSAY OF TROPONIN QUANT: CPT | Performed by: EMERGENCY MEDICINE

## 2018-05-23 PROCEDURE — 85730 THROMBOPLASTIN TIME PARTIAL: CPT | Performed by: EMERGENCY MEDICINE

## 2018-05-23 PROCEDURE — 80053 COMPREHEN METABOLIC PANEL: CPT | Performed by: EMERGENCY MEDICINE

## 2018-05-23 PROCEDURE — 93005 ELECTROCARDIOGRAM TRACING: CPT

## 2018-05-23 PROCEDURE — 85025 COMPLETE CBC W/AUTO DIFF WBC: CPT | Performed by: EMERGENCY MEDICINE

## 2018-05-23 PROCEDURE — 85610 PROTHROMBIN TIME: CPT | Performed by: EMERGENCY MEDICINE

## 2018-05-23 RX ORDER — METOCLOPRAMIDE HYDROCHLORIDE 5 MG/ML
10 INJECTION INTRAMUSCULAR; INTRAVENOUS ONCE
Status: COMPLETED | OUTPATIENT
Start: 2018-05-23 | End: 2018-05-24

## 2018-05-23 RX ORDER — ACETAMINOPHEN 325 MG/1
650 TABLET ORAL ONCE
Status: COMPLETED | OUTPATIENT
Start: 2018-05-23 | End: 2018-05-23

## 2018-05-23 RX ORDER — DIPHENHYDRAMINE HYDROCHLORIDE 50 MG/ML
25 INJECTION INTRAMUSCULAR; INTRAVENOUS ONCE
Status: COMPLETED | OUTPATIENT
Start: 2018-05-23 | End: 2018-05-24

## 2018-05-23 RX ADMIN — ACETAMINOPHEN 650 MG: 325 TABLET, FILM COATED ORAL at 23:33

## 2018-05-24 ENCOUNTER — APPOINTMENT (EMERGENCY)
Dept: RADIOLOGY | Facility: HOSPITAL | Age: 48
DRG: 103 | End: 2018-05-24
Payer: MEDICARE

## 2018-05-24 ENCOUNTER — APPOINTMENT (INPATIENT)
Dept: NON INVASIVE DIAGNOSTICS | Facility: HOSPITAL | Age: 48
DRG: 103 | End: 2018-05-24
Payer: MEDICARE

## 2018-05-24 PROBLEM — E66.09 CLASS 2 OBESITY DUE TO EXCESS CALORIES IN ADULT: Status: ACTIVE | Noted: 2018-05-24

## 2018-05-24 PROBLEM — M06.9 RHEUMATOID ARTHRITIS (HCC): Status: ACTIVE | Noted: 2018-05-24

## 2018-05-24 PROBLEM — Z98.84 HISTORY OF GASTRIC BYPASS: Status: ACTIVE | Noted: 2018-05-24

## 2018-05-24 PROBLEM — R55 SYNCOPE AND COLLAPSE: Status: ACTIVE | Noted: 2018-05-24

## 2018-05-24 PROBLEM — R51.9 HEADACHE: Status: ACTIVE | Noted: 2018-05-24

## 2018-05-24 PROBLEM — M79.7 FIBROMYALGIA: Status: ACTIVE | Noted: 2018-05-24

## 2018-05-24 PROBLEM — I10 ACCELERATED HYPERTENSION: Status: ACTIVE | Noted: 2018-05-24

## 2018-05-24 PROBLEM — M06.9 RHEUMATOID ARTHRITIS (HCC): Chronic | Status: ACTIVE | Noted: 2018-05-24

## 2018-05-24 PROBLEM — E66.09 CLASS 2 OBESITY DUE TO EXCESS CALORIES IN ADULT: Chronic | Status: ACTIVE | Noted: 2018-05-24

## 2018-05-24 LAB
ALBUMIN SERPL BCP-MCNC: 4.1 G/DL (ref 3.5–5)
ALP SERPL-CCNC: 196 U/L (ref 46–116)
ALT SERPL W P-5'-P-CCNC: 30 U/L (ref 12–78)
ANION GAP SERPL CALCULATED.3IONS-SCNC: 5 MMOL/L (ref 4–13)
ANION GAP SERPL CALCULATED.3IONS-SCNC: 8 MMOL/L (ref 4–13)
APTT PPP: 26 SECONDS (ref 24–36)
AST SERPL W P-5'-P-CCNC: 32 U/L (ref 5–45)
ATRIAL RATE: 75 BPM
BILIRUB SERPL-MCNC: 0.42 MG/DL (ref 0.2–1)
BUN SERPL-MCNC: 11 MG/DL (ref 5–25)
BUN SERPL-MCNC: 12 MG/DL (ref 5–25)
CALCIUM SERPL-MCNC: 9.2 MG/DL (ref 8.3–10.1)
CALCIUM SERPL-MCNC: 9.6 MG/DL (ref 8.3–10.1)
CHLORIDE SERPL-SCNC: 107 MMOL/L (ref 100–108)
CHLORIDE SERPL-SCNC: 107 MMOL/L (ref 100–108)
CHOLEST SERPL-MCNC: 152 MG/DL (ref 50–200)
CO2 SERPL-SCNC: 24 MMOL/L (ref 21–32)
CO2 SERPL-SCNC: 26 MMOL/L (ref 21–32)
CREAT SERPL-MCNC: 1.05 MG/DL (ref 0.6–1.3)
CREAT SERPL-MCNC: 1.07 MG/DL (ref 0.6–1.3)
ERYTHROCYTE [DISTWIDTH] IN BLOOD BY AUTOMATED COUNT: 15.8 % (ref 11.6–15.1)
EST. AVERAGE GLUCOSE BLD GHB EST-MCNC: 108 MG/DL
GFR SERPL CREATININE-BSD FRML MDRD: 61 ML/MIN/1.73SQ M
GFR SERPL CREATININE-BSD FRML MDRD: 62 ML/MIN/1.73SQ M
GLUCOSE SERPL-MCNC: 81 MG/DL (ref 65–140)
GLUCOSE SERPL-MCNC: 84 MG/DL (ref 65–140)
HBA1C MFR BLD: 5.4 % (ref 4.2–6.3)
HCT VFR BLD AUTO: 41.1 % (ref 34.8–46.1)
HDLC SERPL-MCNC: 73 MG/DL (ref 40–60)
HGB BLD-MCNC: 13.4 G/DL (ref 11.5–15.4)
INR PPP: 0.95 (ref 0.86–1.17)
LDLC SERPL CALC-MCNC: 68 MG/DL (ref 0–100)
MAGNESIUM SERPL-MCNC: 2.4 MG/DL (ref 1.6–2.6)
MCH RBC QN AUTO: 28.7 PG (ref 26.8–34.3)
MCHC RBC AUTO-ENTMCNC: 32.6 G/DL (ref 31.4–37.4)
MCV RBC AUTO: 88 FL (ref 82–98)
NONHDLC SERPL-MCNC: 79 MG/DL
P AXIS: 56 DEGREES
PLATELET # BLD AUTO: 306 THOUSANDS/UL (ref 149–390)
PMV BLD AUTO: 11 FL (ref 8.9–12.7)
POTASSIUM SERPL-SCNC: 3.5 MMOL/L (ref 3.5–5.3)
POTASSIUM SERPL-SCNC: 4.9 MMOL/L (ref 3.5–5.3)
PR INTERVAL: 150 MS
PROT SERPL-MCNC: 7.5 G/DL (ref 6.4–8.2)
PROTHROMBIN TIME: 12.8 SECONDS (ref 11.8–14.2)
QRS AXIS: 8 DEGREES
QRSD INTERVAL: 76 MS
QT INTERVAL: 362 MS
QTC INTERVAL: 404 MS
RBC # BLD AUTO: 4.67 MILLION/UL (ref 3.81–5.12)
SODIUM SERPL-SCNC: 138 MMOL/L (ref 136–145)
SODIUM SERPL-SCNC: 139 MMOL/L (ref 136–145)
T WAVE AXIS: 49 DEGREES
TRIGL SERPL-MCNC: 56 MG/DL
TROPONIN I SERPL-MCNC: <0.02 NG/ML
TROPONIN I SERPL-MCNC: <0.02 NG/ML
VENTRICULAR RATE: 75 BPM
WBC # BLD AUTO: 4.76 THOUSAND/UL (ref 4.31–10.16)

## 2018-05-24 PROCEDURE — 99223 1ST HOSP IP/OBS HIGH 75: CPT | Performed by: HOSPITALIST

## 2018-05-24 PROCEDURE — 84484 ASSAY OF TROPONIN QUANT: CPT | Performed by: HOSPITALIST

## 2018-05-24 PROCEDURE — 93010 ELECTROCARDIOGRAM REPORT: CPT | Performed by: INTERNAL MEDICINE

## 2018-05-24 PROCEDURE — 80048 BASIC METABOLIC PNL TOTAL CA: CPT | Performed by: HOSPITALIST

## 2018-05-24 PROCEDURE — 96361 HYDRATE IV INFUSION ADD-ON: CPT

## 2018-05-24 PROCEDURE — 85027 COMPLETE CBC AUTOMATED: CPT | Performed by: HOSPITALIST

## 2018-05-24 PROCEDURE — 93306 TTE W/DOPPLER COMPLETE: CPT

## 2018-05-24 PROCEDURE — 80061 LIPID PANEL: CPT | Performed by: HOSPITALIST

## 2018-05-24 PROCEDURE — 83036 HEMOGLOBIN GLYCOSYLATED A1C: CPT | Performed by: HOSPITALIST

## 2018-05-24 PROCEDURE — 99285 EMERGENCY DEPT VISIT HI MDM: CPT

## 2018-05-24 PROCEDURE — 70498 CT ANGIOGRAPHY NECK: CPT

## 2018-05-24 PROCEDURE — 96375 TX/PRO/DX INJ NEW DRUG ADDON: CPT

## 2018-05-24 PROCEDURE — 96374 THER/PROPH/DIAG INJ IV PUSH: CPT

## 2018-05-24 PROCEDURE — 71046 X-RAY EXAM CHEST 2 VIEWS: CPT

## 2018-05-24 PROCEDURE — 83735 ASSAY OF MAGNESIUM: CPT | Performed by: HOSPITALIST

## 2018-05-24 PROCEDURE — 99223 1ST HOSP IP/OBS HIGH 75: CPT | Performed by: PSYCHIATRY & NEUROLOGY

## 2018-05-24 PROCEDURE — 70496 CT ANGIOGRAPHY HEAD: CPT

## 2018-05-24 RX ORDER — SENNOSIDES 8.6 MG
1 TABLET ORAL DAILY
Status: DISCONTINUED | OUTPATIENT
Start: 2018-05-24 | End: 2018-05-25 | Stop reason: HOSPADM

## 2018-05-24 RX ORDER — ASPIRIN 81 MG/1
81 TABLET, CHEWABLE ORAL DAILY
Status: DISCONTINUED | OUTPATIENT
Start: 2018-05-24 | End: 2018-05-25 | Stop reason: HOSPADM

## 2018-05-24 RX ORDER — HYDROXYCHLOROQUINE SULFATE 200 MG/1
200 TABLET, FILM COATED ORAL 2 TIMES DAILY
Status: DISCONTINUED | OUTPATIENT
Start: 2018-05-24 | End: 2018-05-25 | Stop reason: HOSPADM

## 2018-05-24 RX ORDER — DOCUSATE SODIUM 100 MG/1
100 CAPSULE, LIQUID FILLED ORAL 2 TIMES DAILY
Status: DISCONTINUED | OUTPATIENT
Start: 2018-05-24 | End: 2018-05-25 | Stop reason: HOSPADM

## 2018-05-24 RX ORDER — SUMATRIPTAN 50 MG/1
50 TABLET, FILM COATED ORAL ONCE AS NEEDED
Status: DISCONTINUED | OUTPATIENT
Start: 2018-05-24 | End: 2018-05-25 | Stop reason: HOSPADM

## 2018-05-24 RX ORDER — KETOROLAC TROMETHAMINE 30 MG/ML
15 INJECTION, SOLUTION INTRAMUSCULAR; INTRAVENOUS ONCE
Status: COMPLETED | OUTPATIENT
Start: 2018-05-24 | End: 2018-05-24

## 2018-05-24 RX ORDER — ONDANSETRON 2 MG/ML
4 INJECTION INTRAMUSCULAR; INTRAVENOUS EVERY 6 HOURS PRN
Status: DISCONTINUED | OUTPATIENT
Start: 2018-05-24 | End: 2018-05-25 | Stop reason: HOSPADM

## 2018-05-24 RX ORDER — AMITRIPTYLINE HYDROCHLORIDE 50 MG/1
50 TABLET, FILM COATED ORAL
Status: DISCONTINUED | OUTPATIENT
Start: 2018-05-24 | End: 2018-05-25 | Stop reason: HOSPADM

## 2018-05-24 RX ORDER — POLYETHYLENE GLYCOL 3350 17 G/17G
17 POWDER, FOR SOLUTION ORAL DAILY PRN
Status: DISCONTINUED | OUTPATIENT
Start: 2018-05-24 | End: 2018-05-25 | Stop reason: HOSPADM

## 2018-05-24 RX ORDER — GARLIC 180 MG
40 TABLET, DELAYED RELEASE (ENTERIC COATED) ORAL DAILY
Status: DISCONTINUED | OUTPATIENT
Start: 2018-05-24 | End: 2018-05-24 | Stop reason: SDUPTHER

## 2018-05-24 RX ORDER — MELATONIN
2000 2 TIMES DAILY
Status: DISCONTINUED | OUTPATIENT
Start: 2018-05-24 | End: 2018-05-25 | Stop reason: HOSPADM

## 2018-05-24 RX ORDER — ACETAMINOPHEN 325 MG/1
650 TABLET ORAL EVERY 6 HOURS PRN
Status: DISCONTINUED | OUTPATIENT
Start: 2018-05-24 | End: 2018-05-25 | Stop reason: HOSPADM

## 2018-05-24 RX ORDER — DICYCLOMINE HYDROCHLORIDE 10 MG/1
20 CAPSULE ORAL AS NEEDED
Status: DISCONTINUED | OUTPATIENT
Start: 2018-05-24 | End: 2018-05-25 | Stop reason: HOSPADM

## 2018-05-24 RX ORDER — TOPIRAMATE 25 MG/1
25 CAPSULE, COATED PELLETS ORAL 2 TIMES DAILY
Status: DISCONTINUED | OUTPATIENT
Start: 2018-05-24 | End: 2018-05-25 | Stop reason: HOSPADM

## 2018-05-24 RX ORDER — PRAZOSIN HYDROCHLORIDE 1 MG/1
2 CAPSULE ORAL 2 TIMES DAILY
Status: DISCONTINUED | OUTPATIENT
Start: 2018-05-24 | End: 2018-05-25

## 2018-05-24 RX ORDER — SODIUM CHLORIDE 9 MG/ML
75 INJECTION, SOLUTION INTRAVENOUS CONTINUOUS
Status: DISCONTINUED | OUTPATIENT
Start: 2018-05-24 | End: 2018-05-24

## 2018-05-24 RX ORDER — LANOLIN ALCOHOL/MO/W.PET/CERES
9 CREAM (GRAM) TOPICAL
Status: DISCONTINUED | OUTPATIENT
Start: 2018-05-24 | End: 2018-05-25 | Stop reason: HOSPADM

## 2018-05-24 RX ORDER — LABETALOL HYDROCHLORIDE 5 MG/ML
10 INJECTION, SOLUTION INTRAVENOUS EVERY 4 HOURS PRN
Status: DISCONTINUED | OUTPATIENT
Start: 2018-05-24 | End: 2018-05-25 | Stop reason: HOSPADM

## 2018-05-24 RX ORDER — GABAPENTIN 300 MG/1
300 CAPSULE ORAL 3 TIMES DAILY
Status: DISCONTINUED | OUTPATIENT
Start: 2018-05-24 | End: 2018-05-25 | Stop reason: HOSPADM

## 2018-05-24 RX ADMIN — KETOROLAC TROMETHAMINE 15 MG: 30 INJECTION, SOLUTION INTRAMUSCULAR at 19:20

## 2018-05-24 RX ADMIN — ASPIRIN 81 MG 81 MG: 81 TABLET ORAL at 08:02

## 2018-05-24 RX ADMIN — GABAPENTIN 300 MG: 300 CAPSULE ORAL at 08:03

## 2018-05-24 RX ADMIN — DOCUSATE SODIUM 100 MG: 100 CAPSULE, LIQUID FILLED ORAL at 17:14

## 2018-05-24 RX ADMIN — SODIUM CHLORIDE 1000 ML: 0.9 INJECTION, SOLUTION INTRAVENOUS at 00:07

## 2018-05-24 RX ADMIN — DULOXETINE HYDROCHLORIDE 90 MG: 60 CAPSULE, DELAYED RELEASE ORAL at 22:12

## 2018-05-24 RX ADMIN — SODIUM CHLORIDE 75 ML/HR: 0.9 INJECTION, SOLUTION INTRAVENOUS at 05:07

## 2018-05-24 RX ADMIN — ACETAMINOPHEN 650 MG: 325 TABLET, FILM COATED ORAL at 18:08

## 2018-05-24 RX ADMIN — PRAZOSIN HYDROCHLORIDE 2 MG: 1 CAPSULE ORAL at 08:08

## 2018-05-24 RX ADMIN — GABAPENTIN 300 MG: 300 CAPSULE ORAL at 16:07

## 2018-05-24 RX ADMIN — IOHEXOL 85 ML: 350 INJECTION, SOLUTION INTRAVENOUS at 03:03

## 2018-05-24 RX ADMIN — TOPIRAMATE 25 MG: 25 CAPSULE, COATED PELLETS ORAL at 17:14

## 2018-05-24 RX ADMIN — GABAPENTIN 300 MG: 300 CAPSULE ORAL at 22:08

## 2018-05-24 RX ADMIN — ACETAMINOPHEN 650 MG: 325 TABLET, FILM COATED ORAL at 08:07

## 2018-05-24 RX ADMIN — SUMATRIPTAN SUCCINATE 50 MG: 50 TABLET, FILM COATED ORAL at 13:34

## 2018-05-24 RX ADMIN — MELATONIN TAB 3 MG 9 MG: 3 TAB at 22:08

## 2018-05-24 RX ADMIN — ENOXAPARIN SODIUM 40 MG: 40 INJECTION SUBCUTANEOUS at 08:04

## 2018-05-24 RX ADMIN — METOCLOPRAMIDE 10 MG: 5 INJECTION, SOLUTION INTRAMUSCULAR; INTRAVENOUS at 00:04

## 2018-05-24 RX ADMIN — TOPIRAMATE 25 MG: 25 CAPSULE, COATED PELLETS ORAL at 08:02

## 2018-05-24 RX ADMIN — HYDROXYCHLOROQUINE SULFATE 200 MG: 200 TABLET, FILM COATED ORAL at 17:15

## 2018-05-24 RX ADMIN — VITAMIN D, TAB 1000IU (100/BT) 2000 UNITS: 25 TAB at 08:02

## 2018-05-24 RX ADMIN — PRAZOSIN HYDROCHLORIDE 2 MG: 1 CAPSULE ORAL at 22:12

## 2018-05-24 RX ADMIN — AMITRIPTYLINE HYDROCHLORIDE 50 MG: 50 TABLET, FILM COATED ORAL at 22:13

## 2018-05-24 RX ADMIN — HYDROXYCHLOROQUINE SULFATE 200 MG: 200 TABLET, FILM COATED ORAL at 08:03

## 2018-05-24 RX ADMIN — DIPHENHYDRAMINE HYDROCHLORIDE 25 MG: 50 INJECTION, SOLUTION INTRAMUSCULAR; INTRAVENOUS at 00:03

## 2018-05-24 RX ADMIN — VITAMIN D, TAB 1000IU (100/BT) 2000 UNITS: 25 TAB at 17:14

## 2018-05-24 NOTE — H&P
H&P- [facility] 1970, 46 y o  female MRN: [id number]    Unit/Bed#: [facility] 512-01 Encounter: [id number]    Primary Care Provider: Ángel Suarez MD   Date and time admitted to hospital: 5/23/2018 10:13 PM        * Syncope and collapse   Assessment & Plan    Will admit to telemetry ,rule out arrhythmia vs vasovagal vs neurological etiology given transient neurologic deficit of visual loss  Serial cardiac enzymes   echocardiogram  MRI of the brain  Check orthostatic vitals  Neurochecks  IV hydration  Neurology consult given transient complain of left visual field deficit        Accelerated hypertension   Assessment & Plan    Blood pressure on admission 181/107  On Minipress  Labetalol p r n  History of gastric bypass   Assessment & Plan    Patient follows with Hematology Dr Roel Newman who discontinued B12and iron PO supplements  patient getting IV iron infusion on p r n  Basis  Hemoglobin is stable  Continue to follow up with Hematology        Rheumatoid arthritis [facility])   Assessment & Plan    Continue Plaquenil        Fibromyalgia   Assessment & Plan    Continue amitriptyline, Cymbalta, Topamax        Class 2 obesity due to excess calories in adult   Assessment & Plan    Status post gastric bypass in 2004  BMI 39  Low-calorie diet        Headache   Assessment & Plan    Patient has a history of migraines with aura and photosensitive, states the current type of headache different  on Topamax amitriptyline Cymbalta  Tylenol p r n  Imaging study negative for acute intracranial pathology  Neurology consult  Continue aspirin          VTE Prophylaxis: Enoxaparin (Lovenox)  / sequential compression device   Code Status: full  POLST: There is no POLST form on file for this patient (pre-hospital)  Discussion with family:  Patient's  at the bedside    Anticipated Length of Stay:  Patient will be admitted on an Inpatient basis with an anticipated length of stay of  > 2 midnights     Justification for Hospital Stay:  Neurology consult    Total Time for Visit, including Counseling / Coordination of Care: 45 minutes  Greater than 50% of this total time spent on direct patient counseling and coordination of care  Chief Complaint:   Syncopal episode, headache, visual disturbance    History of Present Illness:    Loretta Denny is a 46 y o  female with history of RA, migraine, fibromyalgia, gastric bypass in 2004, on iron IV infusions as needed who presented from home for evaluation of  multiple complaints  Patient reports feeling lightheadedness associated with acute episode of severe stabbing  left-sided headache and left-sided facial pain, then she had temporary left eye vision loss and subsequent syncopal episode without prodromal signs ,seizure activity, bowel / urinary incontinence or postictal state post event  Episode was witnessed by patient's son  She was unresponsive x 2-3 min  Denies any neurologic deficit during my assessment, fever, chills, neck stiffness, photophobia, vomitus  Patient has a history of migraines with photosensitivity an aura but she claims is this left-sided headache with left facial pain is something different to her usual migraine presentation  CTA head and neck did not reveal intracranial pathology or stenosis of [place] cercle  Blood pressure on admission 181/107  EKG sinus rhythm  Labs reviewed electrolytes normal hemoglobin stable  Patient admitted on an  inpatient basis for further syncope and headache workup          Review of Systems:    Review of Systems   Eyes: Positive for visual disturbance  Neurological: Positive for syncope and headaches         Past Medical and Surgical History:     Past Medical History:   Diagnosis Date    Anxiety     B12 deficiency     Bipolar disorder (HCC)     Fibromyalgia     Fibromyalgia, primary     Hypertension     Iron deficiency anemia     Irritable bowel syndrome     Lupus     Migraines     Psychiatric disorder     PTSD (post-traumatic stress disorder)     Rheumatoid arthritis ([facility] [address] )        Past Surgical History:   Procedure Laterality Date    CHOLECYSTECTOMY      ENDOMETRIAL ABLATION      GASTRIC BYPASS      HYSTERECTOMY      [place] KNEE SCOPE,MED/LAT MENISECTOMY Left 8/18/2017    Procedure: KNEE DIAGNOSTIC ARTHROSCOPY;  ANTEROMEDIAL PLICA  RESECTION;  Surgeon: Felix Varela MD;  Location:  MAIN OR;  Service: Orthopedics    WISDOM TOOTH EXTRACTION         Meds/Allergies:    Prior to Admission medications    Medication Sig Start Date End Date Taking? Authorizing Provider   amitriptyline (ELAVIL) 25 mg tablet Take 50 mg by mouth daily at bedtime     Yes Historical Provider, MD   Black Cohosh 40 MG CAPS Take 40 mg by mouth daily   Yes Historical Provider, MD   Cholecalciferol (VITAMIN D-3 PO) Take 2,000 Units by mouth 2 (two) times a day     Yes Historical Provider, MD   dicyclomine (BENTYL) 10 mg capsule Take 20 mg by mouth as needed     Yes Historical Provider, MD   DULoxetine (CYMBALTA) 30 mg delayed release capsule Take 90 mg by mouth daily     Yes Historical Provider, MD   gabapentin (NEURONTIN) 300 mg capsule Take 300 mg by mouth 3 (three) times a day  Yes Historical Provider, MD   hydroxychloroquine (PLAQUENIL) 200 mg tablet Take 200 mg by mouth 2 (two) times a day     Yes Historical Provider, MD   MELATONIN GUMMIES PO Take 15 mg by mouth daily at bedtime   Yes Historical Provider, MD   Melatonin-FERMIN-Valerian 6-30-50 MG CAPS Take by mouth   Yes Historical Provider, MD   prazosin (MINIPRESS) 1 mg capsule Take 2 mg by mouth 2 (two) times a day     Yes Historical Provider, MD   promethazine (PHENERGAN) 25 mg tablet Take 25 mg by mouth every 6 (six) hours as needed for nausea or vomiting  Yes Historical Provider, MD   SUMAtriptan (IMITREX) 50 mg tablet Take 50 mg by mouth once as needed for migraine     Yes Historical Provider, MD   SUMAtriptan Succinate (SUMAVEL DOSEPRO) 6 MG/0 5ML SOTJ Inject 6 mg under the skin as needed     Yes Historical Provider, MD   topiramate (TOPAMAX) 25 mg sprinkle capsule Take 25 mg by mouth 2 (two) times a day  Yes Historical Provider, MD   magnesium citrate solution Take 296 mL by mouth once  5/24/18 Yes Historical Provider, MD     I have reviewed home medications with a medical source (PCP, Pharmacy, other)  Allergies: No Known Allergies    Social History:     Marital Status:    Occupation:  Unknown  Patient Pre-hospital Living Situation: home  Patient Pre-hospital Level of Mobility: reg  Patient Pre-hospital Diet Restrictions: reg  Substance Use History:   History   Alcohol Use No     History   Smoking Status    Never Smoker   Smokeless Tobacco    Never Used     History   Drug Use No       Family History:    non-contributory    Physical Exam:     Vitals:   Blood Pressure: 102/52 (05/24/18 0710)  Pulse: 59 (05/24/18 0710)  Temperature: 97 5 °F (36 4 °C) (05/24/18 0710)  Temp Source: Oral (05/24/18 0710)  Respirations: 16 (05/24/18 0710)  Height: 5' 3" (160 cm) (05/24/18 0511)  Weight - Scale: 100 kg (220 lb 14 4 oz) (05/24/18 0511)  SpO2: 100 % (05/24/18 0710)    Physical Exam   Constitutional: No distress  HENT:   Head: Normocephalic  Eyes: Pupils are equal, round, and reactive to light  Neck: Normal range of motion  Cardiovascular: Regular rhythm  Pulmonary/Chest: No respiratory distress  Abdominal: She exhibits no distension  Musculoskeletal: She exhibits no edema  Neurological: She is alert  Skin: Skin is warm  Psychiatric: She has a normal mood and affect  Additional Data:     Lab Results: I have personally reviewed pertinent reports          Results from last 7 days  Lab Units 05/24/18  0532 05/23/18  2336   WBC Thousand/uL 4 76 5 19   HEMOGLOBIN g/dL 13 4 13 5   HEMATOCRIT % 41 1 40 5   PLATELETS Thousands/uL 306 329   NEUTROS PCT %  --  71   LYMPHS PCT %  --  23   MONOS PCT %  --  4   EOS PCT %  --  1       Results from last 7 days  Lab Units 05/24/18  0532 05/23/18  2336   SODIUM mmol/L 139 138   POTASSIUM mmol/L 3 5 4 9   CHLORIDE mmol/L 107 107   CO2 mmol/L 24 26   BUN mg/dL 11 12   CREATININE mg/dL 1 05 1 07   CALCIUM mg/dL 9 2 9 6   TOTAL PROTEIN g/dL  --  7 5   BILIRUBIN TOTAL mg/dL  --  0 42   ALK PHOS U/L  --  196*   ALT U/L  --  30   AST U/L  --  32   GLUCOSE RANDOM mg/dL 81 84       Results from last 7 days  Lab Units 05/23/18  2336   INR  0 95           Results from last 7 days  Lab Units 05/24/18  0532   HEMOGLOBIN A1C % 5 4       Imaging: I have personally reviewed pertinent reports  CTA head and neck with and without contrast   Final Result by Keerthi Martinez MD (05/24 0202)      1  No acute intracranial hemorrhage, midline shift, or mass effect  2   No hemodynamically severe stenosis of the arteries of the neck  3   No high-grade proximal stenosis of the visualized [place] of [place]  4   No aneurysm or arteriovenous malformation  Workstation performed: [id number]         XR chest 2 views    (Results Pending)   MRI inpatient order    (Results Pending)       EKG, Pathology, and Other Studies Reviewed on Admission:   · EKG: sinus rhythm    Allscripts / Epic Records Reviewed: Yes     ** Please Note: This note has been constructed using a voice recognition system   **

## 2018-05-24 NOTE — PROGRESS NOTES
Post admission follow-up  Patient was seen and examined  Patient reported that her headache is improving currently  Patient with known history of migraine  Patient had transient visual deficits before coming to the hospital which is currently resolved  CT scan was unremarkable  Neurology evaluation pending  MRI pending  Patient had the echocardiogram done the official read is pending at this point    Complex migraine versus TIA  Follow-up on the neurology evaluation  Follow-up on the echocardiogram  New pain management for the migraine  All available laboratory investigations and imaging studies reviewed  Patient is alert awake oriented x3 chest CTA bilateral CV is S1-S2 heard abdomen is soft nontender extremities no clubbing no cyanosis no edema   CNS alert awake oriented x3, no motor or sensory deficits noted on examination

## 2018-05-24 NOTE — ASSESSMENT & PLAN NOTE
Patient follows with Hematology Dr Roel Muir who discontinued B12and iron PO supplements  patient getting IV iron infusion on p r n   Basis  Hemoglobin is stable  Continue to follow up with Hematology

## 2018-05-24 NOTE — ED ATTENDING ATTESTATION
Stacey Bach MD, saw and evaluated the patient  I have discussed the patient with the resident/non-physician practitioner and agree with the resident's/non-physician practitioner's findings, Plan of Care, and MDM as documented in the resident's/non-physician practitioner's note, except where noted  All available labs and Radiology studies were reviewed  At this point I agree with the current assessment done in the Emergency Department  I have conducted an independent evaluation of this patient a history and physical is as follows:     The patient presents for evaluation of headache that is left-sided in nature and episode of syncope the headache is similar to prior migraine headaches she states that she felt dizzy and lost vision in her left visual field prior to the episode patient does have history of migraines with aura  She has no neck pain or neck stiffness she has no fever or chills she states that the visual changes have completely resolved she also complains numbness in her foot  Exam the patient is in no acute distress HEENT pupils equal reactive to light extraocular movements intact confrontational visual fields intact neck supple no carotid bruits lungs clear heart regular no murmurs abdomen soft nontender neurologic exam cranial nerves 2-12 intact motor 5/5 sensory grossly intact cerebellar testing normal  Impression:  Likely migraine with neurologic symptoms however the to rule out stroke will do CT of head CTA of head and neck patient will be admitted to the hospital Will treat from migraine less Toradol until CT scans are obtained  Critical Care Time  CritCare Time    Procedures

## 2018-05-24 NOTE — CONSULTS
Consultation - Neurology   Clarisa Cruz 46 y o  female MRN: [id number]  Unit/Bed#: [facility] 018-23 Encounter: [id number]      Assessment/Plan   Assessment:  46year old female with history of rheumatoid arthritis, lupus, fibromyalgia, iron deficiency anemia like from gastric bypass in June 2004, psychiatric disease involving bipolar/PTSD/anxiety who presents with multiple complaints including headache, transient left eye vision loss and syncopal episode  Plan:  1  MRI brain pending  -CTA head/neck without evidence of cervical/intracranial vascular abnormalities  -Echo done, read pending  2  Orthostatic BP's reviewed  3  For preventative Ha therapy, continuing Elavil at night and Topamax BID  Imitrex prn ordered for abortive therapy    -Tylenol prn for headache  4  Will check ESR/CRP  5  Received IV Fluids  6  Syncopal event without features suggestive of seizure activity  Can defer EEG  7  Supportive care  8  Will further discuss plan of care with attending neurologist     History of Present Illness     Reason for Consult / Principal Problem: Syncope, headache, transient vision of left eye    HPI: Clarisa Cruz is a 46 y o  female with history of rheumatoid arthritis, lupus, fibromyalgia, iron deficiency anemia like from gastric bypass in June 2004, psychiatric disease involving bipolar/PTSD/anxiety who presents with multiple complaints including headache, transient left eye vision loss and syncopal episode  Events yesterday discussed with patient in detail this morning  Patient carries a history of migraines for many years, but notes frequency is relatively  Infrequent (1 or 2 a month)  With her migraine she has photosensitivity, nausea/vomiting  Usually last several hours in duration, and resolved typically with Imitrex  For preventative medications she takes Topamax and Elavil  On Tuesday she received an iron infusion, and upon awakening yesterday morning did not feel right    She also had lightheaded sensation and some nauseousness throughout the day  Due to the symptoms, patient was in the recliner last night and was planning to go to bed early  When she stood up to go to bed, she became acutely dizzy/lightheaded; She also experienced a sudden onset sharp/stabbing pain on the left side of her head and left face (brief, lasting 1 second)  She attempted to sit herself back on the couch, but syncopized and fell on the floor  Son was at home and assisted patient  LOC lasted 3-4 minutes in duration approximately  Son did not witness any seizure-like activity, no tongue bite or incontinence  Once patient awoke she noted loss of vision specifically with her left eye (over the course of the next hour her vision slowly returned in her left eye back to baseline)  Due to these events, patient presented to the ED for evaluation last night  Patient presented to the ED last night, hypertensive with initial pressure 181/107  No metabolic/infectious derangements other than elevated alk-phos  CTA head/neck without acute intracranial pathology, no flow restrictive disease in the intracranial/cervical vasculature  Per chart review, she was evaluated approximately 1 year ago in late May 2017 for episode of syncope (preceded by lightheadedness, LOC for about a minute, with quick return of mental status)  Patient was administered IV fluids, Tylenol for headache and discharged home  this morning, she is resting in bed with the lights turned off  On continuous IV fluids at 75 mL/hour, received her normal headache medications as well as Tylenol  Currently she states the headache is somewhat improved compared to yesterday  Inpatient consult to Neurology  Consult performed by: Valeria Lozada ordered by: Vega Dee          Review of Systems   Constitutional: Negative  HENT: Negative for trouble swallowing  Eyes: Positive for photophobia  Negative for visual disturbance  Respiratory: Negative for chest tightness, shortness of breath and wheezing  Cardiovascular: Negative for chest pain and palpitations  Gastrointestinal: Positive for nausea  Negative for abdominal pain and vomiting  Musculoskeletal: Negative  Skin: Negative  Neurological: Positive for dizziness, syncope, light-headedness and headaches  Negative for tremors, seizures, facial asymmetry, speech difficulty, weakness and numbness  Historical Information   Past Medical History:   Diagnosis Date    Anxiety     B12 deficiency     Bipolar disorder (HCC)     Fibromyalgia     Fibromyalgia, primary     Hypertension     Iron deficiency anemia     Irritable bowel syndrome     Lupus     Migraines     Psychiatric disorder     PTSD (post-traumatic stress disorder)     Rheumatoid arthritis (HCC)      Past Surgical History:   Procedure Laterality Date    CHOLECYSTECTOMY      ENDOMETRIAL ABLATION      GASTRIC BYPASS      HYSTERECTOMY      OR KNEE SCOPE,MED/LAT MENISECTOMY Left 8/18/2017    Procedure: KNEE DIAGNOSTIC ARTHROSCOPY;  ANTEROMEDIAL PLICA  RESECTION;  Surgeon: Dolores Lorenz MD;  Location: [facility] OR;  Service: Orthopedics    WISDOM TOOTH EXTRACTION       Social History   History   Alcohol Use No     History   Drug Use No     History   Smoking Status    Never Smoker   Smokeless Tobacco    Never Used     Family History:   Family History   Problem Relation Age of Onset    No Known Problems Mother     Heart disease Father     Hypertension Father     No Known Problems Sister        Review of previous medical records was completed      Meds/Allergies   current meds:   Current Facility-Administered Medications   Medication Dose Route Frequency    acetaminophen (TYLENOL) tablet 650 mg  650 mg Oral Q6H PRN    amitriptyline (ELAVIL) tablet 50 mg  50 mg Oral HS    aspirin chewable tablet 81 mg  81 mg Oral Daily    cholecalciferol (VITAMIN D3) tablet 2,000 Units  2,000 Units Oral BID    dicyclomine (BENTYL) capsule 20 mg  20 mg Oral PRN    docusate sodium (COLACE) capsule 100 mg  100 mg Oral BID    DULoxetine (CYMBALTA) delayed release capsule 90 mg  90 mg Oral Daily    enoxaparin (LOVENOX) subcutaneous injection 40 mg  40 mg Subcutaneous Daily    gabapentin (NEURONTIN) capsule 300 mg  300 mg Oral TID    hydroxychloroquine (PLAQUENIL) tablet 200 mg  200 mg Oral BID    labetalol (NORMODYNE) injection 10 mg  10 mg Intravenous Q4H PRN    melatonin tablet 9 mg  9 mg Oral HS    ondansetron (ZOFRAN) injection 4 mg  4 mg Intravenous Q6H PRN    polyethylene glycol (MIRALAX) packet 17 g  17 g Oral Daily PRN    prazosin (MINIPRESS) capsule 2 mg  2 mg Oral BID    senna (SENOKOT) tablet 8 6 mg  1 tablet Oral Daily    sodium chloride 0 9 % infusion  75 mL/hr Intravenous Continuous    SUMAtriptan (IMITREX) tablet 50 mg  50 mg Oral Once PRN    topiramate (TOPAMAX) sprinkle capsule 25 mg  25 mg Oral BID     Facility-Administered Medications Ordered in Other Encounters   Medication Dose Route Frequency    alteplase (CATHFLO) injection 2 mg  2 mg Intracatheter PRN    heparin lock flush 100 units/mL injection 300 Units  300 Units Intracatheter Q1H PRN    and PTA meds:   Prior to Admission Medications   Prescriptions Last Dose Informant Patient Reported? Taking?    Black Cohosh 40 MG CAPS 5/23/2018 at Unknown time Self Yes Yes   Sig: Take 40 mg by mouth daily   Cholecalciferol (VITAMIN D-3 PO) 5/23/2018 at Unknown time Self Yes Yes   Sig: Take 2,000 Units by mouth 2 (two) times a day     DULoxetine (CYMBALTA) 30 mg delayed release capsule 5/23/2018 at Unknown time Self Yes Yes   Sig: Take 90 mg by mouth daily     MELATONIN GUMMIES PO 5/23/2018 at Unknown time Self Yes Yes   Sig: Take 15 mg by mouth daily at bedtime   Melatonin-FERMIN-Valerian 6-30-50 MG CAPS 5/23/2018 at Unknown time Self Yes Yes   Sig: Take by mouth   SUMAtriptan (IMITREX) 50 mg tablet 5/23/2018 at Unknown time Self Yes Yes   Sig: Take 50 mg by mouth once as needed for migraine  SUMAtriptan Succinate (SUMAVEL DOSEPRO) 6 MG/0 5ML SOTJ 5/23/2018 at Unknown time Self Yes Yes   Sig: Inject 6 mg under the skin as needed     amitriptyline (ELAVIL) 25 mg tablet 5/23/2018 at Unknown time Self Yes Yes   Sig: Take 50 mg by mouth daily at bedtime     dicyclomine (BENTYL) 10 mg capsule 5/23/2018 at Unknown time Self Yes Yes   Sig: Take 20 mg by mouth as needed     gabapentin (NEURONTIN) 300 mg capsule 5/23/2018 at Unknown time Self Yes Yes   Sig: Take 300 mg by mouth 3 (three) times a day  hydroxychloroquine (PLAQUENIL) 200 mg tablet 5/23/2018 at Unknown time Self Yes Yes   Sig: Take 200 mg by mouth 2 (two) times a day     prazosin (MINIPRESS) 1 mg capsule 5/23/2018 at Unknown time Self Yes Yes   Sig: Take 2 mg by mouth 2 (two) times a day     promethazine (PHENERGAN) 25 mg tablet 5/23/2018 at Unknown time Self Yes Yes   Sig: Take 25 mg by mouth every 6 (six) hours as needed for nausea or vomiting  topiramate (TOPAMAX) 25 mg sprinkle capsule 5/23/2018 at Unknown time Self Yes Yes   Sig: Take 25 mg by mouth 2 (two) times a day  Facility-Administered Medications: None       No Known Allergies    Objective   Vitals:Blood pressure 140/70, pulse 59, temperature 97 5 °F (36 4 °C), temperature source Oral, resp  rate 16, height 5' 3" (1 6 m), weight 100 kg (220 lb 14 4 oz), SpO2 100 %  ,Body mass index is 39 13 kg/m²  Intake/Output Summary (Last 24 hours) at 05/24/18 0944  Last data filed at 05/24/18 0800   Gross per 24 hour   Intake           216 25 ml   Output                0 ml   Net           216 25 ml       Invasive Devices: Invasive Devices     Peripheral Intravenous Line            Peripheral IV 05/24/18 Left Antecubital less than 1 day                Physical Exam   Constitutional: She is oriented to person, place, and time  She appears well-developed and well-nourished  HENT:   Head: Normocephalic and atraumatic     Eyes: Conjunctivae and EOM are normal  Pupils are equal, round, and reactive to light  Neck: Normal range of motion  Neck supple  Cardiovascular: Normal rate and regular rhythm  Pulmonary/Chest: Effort normal and breath sounds normal    Abdominal: Soft  Bowel sounds are normal    Musculoskeletal: Normal range of motion  Neurological: She is alert and oriented to person, place, and time  She has a normal Finger-Nose-Finger Test and a normal Heel to [employer]    Reflex Scores:       Tricep reflexes are 2+ on the right side and 2+ on the left side  Bicep reflexes are 2+ on the right side and 2+ on the left side  Brachioradialis reflexes are 2+ on the right side and 2+ on the left side  Patellar reflexes are 2+ on the right side and 2+ on the left side  Skin: Skin is warm and dry  Psychiatric: Her speech is normal      Neurologic Exam     Mental Status   Oriented to person, place, and time  Follows 2 step commands  Attention: normal  Concentration: normal    Speech: speech is normal   Able to read  Able to repeat  Normal comprehension  Cranial Nerves     CN III, IV, VI   Pupils are equal, round, and reactive to light  Extraocular motions are normal      Motor Exam   Muscle bulk: normal  Overall muscle tone: normal  Right arm pronator drift: absent  Left arm pronator drift: absent  5/5 UE and LE strength throughout  Sensory Exam   Light touch normal    Vibration normal      Temperature sensation intact throughout        Gait, Coordination, and Reflexes     Coordination   Finger to nose coordination: normal  Heel to shin coordination: normal    Tremor   Resting tremor: absent  Intention tremor: absent    Reflexes   Right brachioradialis: 2+  Left brachioradialis: 2+  Right biceps: 2+  Left biceps: 2+  Right triceps: 2+  Left triceps: 2+  Right patellar: 2+  Left patellar: 2+  Right plantar: normal  Left plantar: normal  Right ankle clonus: absent  Left ankle clonus: absent      Lab Results:   CBC: Results from last 7 days  Lab Units 05/24/18  0532 05/23/18  2336   WBC Thousand/uL 4 76 5 19   RBC Million/uL 4 67 4 66   HEMOGLOBIN g/dL 13 4 13 5   HEMATOCRIT % 41 1 40 5   MCV fL 88 87   PLATELETS Thousands/uL 306 329   , BMP/CMP:   Results from last 7 days  Lab Units 05/24/18  0532 05/23/18  2336   SODIUM mmol/L 139 138   POTASSIUM mmol/L 3 5 4 9   CHLORIDE mmol/L 107 107   CO2 mmol/L 24 26   ANION GAP mmol/L 8 5   BUN mg/dL 11 12   CREATININE mg/dL 1 05 1 07   GLUCOSE RANDOM mg/dL 81 84   CALCIUM mg/dL 9 2 9 6   AST U/L  --  32   ALT U/L  --  30   ALK PHOS U/L  --  196*   TOTAL PROTEIN g/dL  --  7 5   BILIRUBIN TOTAL mg/dL  --  0 42   EGFR ml/min/1 73sq m 62 61   , Vitamin B12:   , HgBA1C:   Results from last 7 days  Lab Units 05/24/18  0532   HEMOGLOBIN A1C % 5 4   , TSH:   , Coagulation:   Results from last 7 days  Lab Units 05/23/18  2336   INR  0 95   , Lipid Profile:   Results from last 7 days  Lab Units 05/24/18  0532   HDL mg/dL 73*   CHOLESTEROL mg/dL 152   LDL CALC mg/dL 68   TRIGLYCERIDES mg/dL 56   , Urinalysis:       Invalid input(s): URIBILINOGEN  Imaging Studies: I have personally reviewed pertinent films in PACS   CTA head/neck 5/24/18: 1   No acute intracranial hemorrhage, midline shift, or mass effect  2   No hemodynamically severe stenosis of the arteries of the neck  3   No high-grade proximal stenosis of the visualized [place] of [place]  4   No aneurysm or arteriovenous malformation  XR chest 5/24/18: No acute cardiopulmonary disease  EKG, Pathology, and Other Studies: I have personally reviewed pertinent reports  VTE Prophylaxis: Sequential compression device (Venodyne)  and Enoxaparin (Lovenox)    Code Status: Level 1 - Full Code    Counseling / Coordination of Care  Total time spent today 60 minutes  Greater than 50% of total time was spent with the patient and / or family counseling and / or coordination of care   A description of the counseling / coordination of care: Discussed plan of care with patient

## 2018-05-24 NOTE — CASE MANAGEMENT
Initial Clinical Review    Admission: Date/Time/Statement: 5/24/18 @ 0403     Orders Placed This Encounter   Procedures    Inpatient Admission (expected length of stay for this patient is greater than two midnights)     Standing Status:   Standing     Number of Occurrences:   1     Order Specific Question:   Admitting Physician     Answer:   Dhara Nichols     Order Specific Question:   Level of Care     Answer:   Med Surg [16]     Order Specific Question:   Estimated length of stay     Answer:   More than 2 Midnights     Order Specific Question:   Certification     Answer:   I certify that inpatient services are medically necessary for this patient for a duration of greater than two midnights  See H&P and MD Progress Notes for additional information about the patient's course of treatment  ED: Date/Time/Mode of Arrival:   ED Arrival Information     Expected Arrival Acuity Means of Arrival Escorted By Service Admission Type    - 5/23/2018 22:04 Emergent Walk-In Self General Medicine Emergency    Arrival Complaint    Chest Pain, Syncope          Chief Complaint:   Chief Complaint   Patient presents with    Headache     not feeling well all day, dizzy and syncope reported at home, pt reports facial pain and headache, chest tightness, pt reports vision spotty in left eye, left toes "are tingly"       History of Illness: 46 y o  female with history of RA, migraine, fibromyalgia, gastric bypass in 2004, on iron IV infusions as needed who presented from home for evaluation of  multiple complaints  Patient reports feeling lightheadedness associated with acute episode of severe stabbing  left-sided headache and left-sided facial pain, then she had temporary left eye vision loss and subsequent syncopal episode without prodromal signs ,seizure activity, bowel / urinary incontinence or postictal state post event  Episode was witnessed by patient's son  She was unresponsive x 2-3 min    Denies any neurologic deficit during my assessment, fever, chills, neck stiffness, photophobia, vomitus  Patient has a history of migraines with photosensitivity an aura but she claims is this left-sided headache with left facial pain is something different to her usual migraine presentation  CTA head and neck did not reveal intracranial pathology or stenosis of [place] cercle  Blood pressure on admission 181/107  EKG sinus rhythm  Labs reviewed electrolytes normal hemoglobin stable  Patient admitted on an  inpatient basis for further syncope and headache workup    ED Vital Signs:   ED Triage Vitals [05/23/18 2209]   Temperature Pulse Respirations Blood Pressure SpO2   97 6 °F (36 4 °C) 72 16 (!) 181/107 97 %      Temp Source Heart Rate Source Patient Position - Orthostatic VS BP Location FiO2 (%)   Tympanic Monitor Sitting Left arm --      Pain Score       7        Wt Readings from Last 1 Encounters:   05/24/18 100 kg (220 lb 14 4 oz)       Vital Signs (abnormal): WNL    Abnormal Labs: Alk phos = 196    Diagnostic Test Results: CTA Head and Neck - 1  No acute intracranial hemorrhage, midline shift, or mass effect  2   No hemodynamically severe stenosis of the arteries of the neck  3   No high-grade proximal stenosis of the visualized [place] of [place]  4   No aneurysm or arteriovenous malformation        ED Treatment:   Medication Administration from 05/23/2018 2204 to 05/24/2018 0431       Date/Time Order Dose Route Action     05/24/2018 0003 diphenhydrAMINE (BENADRYL) injection 25 mg 25 mg Intravenous Given     05/24/2018 0004 metoclopramide (REGLAN) injection 10 mg 10 mg Intravenous Given     05/24/2018 0007 sodium chloride 0 9 % bolus 1,000 mL 1,000 mL Intravenous New Bag     05/23/2018 2333 acetaminophen (TYLENOL) tablet 650 mg 650 mg Oral Given     05/24/2018 0303 iohexol (OMNIPAQUE) 350 MG/ML injection (MULTI-DOSE) 85 mL 85 mL Intravenous Given          Past Medical/Surgical History:    Active Ambulatory Problems Diagnosis Date Noted    Synovial plica of left knee 57/88/7829    Low iron stores 05/01/2018    Elevated alkaline phosphatase level 05/01/2018     Resolved Ambulatory Problems     Diagnosis Date Noted    No Resolved Ambulatory Problems     Past Medical History:   Diagnosis Date    Anxiety     B12 deficiency     Bipolar disorder (HCC)     Fibromyalgia     Fibromyalgia, primary     Hypertension     Iron deficiency anemia     Irritable bowel syndrome     Lupus     Migraines     Psychiatric disorder     PTSD (post-traumatic stress disorder)     Rheumatoid arthritis ([facility] [address] )        Admitting Diagnosis: Syncope and collapse [R55]  Chest pain [R07 9]  Headache [R51]    Age/Sex: 46 y o  female    Assessment/Plan:   * Syncope and collapse   Assessment & Plan     Will admit to telemetry ,rule out arrhythmia vs vasovagal vs neurological etiology given transient neurologic deficit of visual loss  Serial cardiac enzymes   echocardiogram  MRI of the brain  Check orthostatic vitals  Neurochecks  IV hydration  Neurology consult given transient complain of left visual field deficit          Accelerated hypertension   Assessment & Plan     Blood pressure on admission 181/107  On Minipress  Labetalol p r n           History of gastric bypass   Assessment & Plan     Patient follows with Hematology Dr Roel Guzman who discontinued B12and iron PO supplements  patient getting IV iron infusion on p r n   Basis  Hemoglobin is stable  Continue to follow up with Hematology          Rheumatoid arthritis (HCC)   Assessment & Plan     Continue Plaquenil          Fibromyalgia   Assessment & Plan     Continue amitriptyline, Cymbalta, Topamax          Class 2 obesity due to excess calories in adult   Assessment & Plan     Status post gastric bypass in 2004  BMI 39  Low-calorie diet          Headache   Assessment & Plan     Patient has a history of migraines with aura and photosensitive, states the current type of headache different  on Topamax amitriptyline Cymbalta  Tylenol p r n  Imaging study negative for acute intracranial pathology  Neurology consult  Continue aspirin             VTE Prophylaxis: Enoxaparin (Lovenox)  / sequential compression device   Code Status: full     Anticipated Length of Stay:  Patient will be admitted on an Inpatient basis with an anticipated length of stay of  > 2 midnights     Justification for Hospital Stay:  Neurology consult       Admission Orders:  Tele monitoring  Echo  MRI Brain  Neurological checks q4h  Orthostatic B/p  Neurology cons    Scheduled Meds:   Current Facility-Administered Medications:  amitriptyline 50 mg Oral HS   aspirin 81 mg Oral Daily   cholecalciferol 2,000 Units Oral BID   docusate sodium 100 mg Oral BID   DULoxetine 90 mg Oral Daily   enoxaparin 40 mg Subcutaneous Daily   gabapentin 300 mg Oral TID   hydroxychloroquine 200 mg Oral BID   melatonin 9 mg Oral HS   prazosin 2 mg Oral BID   senna 1 tablet Oral Daily   SUMAtriptan 50 mg Oral Once PRN   topiramate 25 mg Oral BID     Continuous Infusions:    PRN Meds:   acetaminophen    dicyclomine    labetalol    ondansetron    polyethylene glycol    SUMAtriptan

## 2018-05-24 NOTE — PHYSICIAN ADVISOR
Current patient class: Inpatient  The patient is currently on Hospital Day: 2      The patient was admitted to the hospital at [address] [place] [address],[address] on 5/24/18 for the following diagnosis:  Syncope and collapse [R55]  Chest pain [R07 9]  Headache [R51]       There is documentation in the medical record of an expected length of stay of at least 2 midnights  The patient is therefore expected to satisfy the 2 midnight benchmark and given the 2 midnight presumption is appropriate for INPATIENT ADMISSION  Given this expectation of a satisfying stay, CMS instructs us that the patient is most often appropriate for inpatient admission under part A provided medical necessity is documented in the chart  After review of the relevant documentation, labs, vital signs and test results, the patient is appropriate for INPATIENT ADMISSION  Admission to the hospital as an inpatient is a complex decision making process which requires the practitioner to consider the patients presenting complaint, history and physical examination and all relevant testing  With this in mind, in this case, the patient was deemed appropriate for INPATIENT ADMISSION  After review of the documentation and testing available at the time of the admission I concur with this clinical determination of medical necessity  Rationale is as follows: The patient is a 46 yrs old Female who presented to the ED at 5/23/2018 10:13 PM with a chief complaint of Headache (not feeling well all day, dizzy and syncope reported at home, pt reports facial pain and headache, chest tightness, pt reports vision spotty in left eye, left toes "are tingly")     Patient admitted with a complaint of headache, transient loss of viwson from left eye and syncope  She was hypertensive on admission to the ED  She has a medical history of HTN, Rheumatoid Arthritis, lupus and migraines  There was a concern for a TIA and a consult with Neurology was ordered    She was admitted to telemetry and an echocardiagram and brain MRI were also ordered  Further acute care will be required to diagnose the etiology of the event that she experienced and a two night admission status to the hospital would be considered appropriate for her      The patients vitals on arrival were ED Triage Vitals [05/23/18 2209]   Temperature Pulse Respirations Blood Pressure SpO2   97 6 °F (36 4 °C) 72 16 (!) 181/107 97 %      Temp Source Heart Rate Source Patient Position - Orthostatic VS BP Location FiO2 (%)   Tympanic Monitor Sitting Left arm --      Pain Score       7           Past Medical History:   Diagnosis Date    Anxiety     B12 deficiency     Bipolar disorder (HCC)     Fibromyalgia     Fibromyalgia, primary     Hypertension     Iron deficiency anemia     Irritable bowel syndrome     Lupus     Migraines     Psychiatric disorder     PTSD (post-traumatic stress disorder)     Rheumatoid arthritis (HCC)      Past Surgical History:   Procedure Laterality Date    CHOLECYSTECTOMY      ENDOMETRIAL ABLATION      GASTRIC BYPASS      HYSTERECTOMY      OR KNEE SCOPE,MED/LAT MENISECTOMY Left 8/18/2017    Procedure: KNEE DIAGNOSTIC ARTHROSCOPY;  ANTEROMEDIAL PLICA  RESECTION;  Surgeon: Sandy Santos MD;  Location: [facility];  Service: Orthopedics    WISDOM TOOTH EXTRACTION             Consults have been placed to:   IP CONSULT TO NEUROLOGY    Vitals:    05/24/18 0710 05/24/18 0808 05/24/18 1040 05/24/18 1500   BP: 102/52 140/70 105/51 122/66   BP Location: Right arm Right arm Right arm    Pulse: 59  90 79   Resp: 16  16 18   Temp: 97 5 °F (36 4 °C)  98 3 °F (36 8 °C) 98 4 °F (36 9 °C)   TempSrc: Oral  Oral Oral   SpO2: 100%  97% 98%   Weight:       Height:           Most recent labs:    Recent Labs      05/23/18   2336  05/24/18   0532   WBC  5 19  4 76   HGB  13 5  13 4   HCT  40 5  41 1   PLT  329  306   K  4 9  3 5   NA  138  139   CALCIUM  9 6  9 2   BUN  12  11   CREATININE  1 07  1 05   INR  0 95   --    TROPONINI <0 02  <0 02   AST  32   --    ALT  30   --    ALKPHOS  196*   --    BILITOT  0 42   --        Scheduled Meds:  Current Facility-Administered Medications:  acetaminophen 650 mg Oral Q6H PRN Torrey Rios MD   amitriptyline 50 mg Oral HS Torrey Rios MD   aspirin 81 mg Oral Daily Torrey Rios MD   cholecalciferol 2,000 Units Oral BID Torrey Rios MD   dicyclomine 20 mg Oral PRN Torrey Rios MD   docusate sodium 100 mg Oral BID Torrey Rios MD   DULoxetine 90 mg Oral Daily Torrey Rios MD   enoxaparin 40 mg Subcutaneous Daily Torrey Riso MD   gabapentin 300 mg Oral TID Torrey Rios MD   hydroxychloroquine 200 mg Oral BID Torrey Rios MD   labetalol 10 mg Intravenous Q4H PRN Torrey Rios MD   melatonin 9 mg Oral HS Torrey Rios MD   ondansetron 4 mg Intravenous Q6H PRN Torrey Rios MD   polyethylene glycol 17 g Oral Daily PRN Torrey Rios MD   prazosin 2 mg Oral BID Torrey Rios MD   senna 1 tablet Oral Daily Torrey Rios MD   SUMAtriptan 50 mg Oral Once PRN Torrey Rios MD   topiramate 25 mg Oral BID Torrey Rios MD     Facility-Administered Medications Ordered in Other Encounters:  alteplase 2 mg Intracatheter PRN Fred Vasquez MD   heparin lock flush 300 Units Intracatheter Q1H PRN Fred Vasquez MD     Continuous Infusions:   PRN Meds:   acetaminophen    dicyclomine    labetalol    ondansetron    polyethylene glycol    SUMAtriptan    Surgical procedures (if appropriate):

## 2018-05-24 NOTE — ASSESSMENT & PLAN NOTE
Will admit to telemetry ,rule out arrhythmia vs vasovagal vs neurological etiology given transient neurologic deficit of visual loss  Serial cardiac enzymes   echocardiogram  MRI of the brain  Check orthostatic vitals  Neurochecks  IV hydration  Neurology consult given transient complain of left visual field deficit

## 2018-05-24 NOTE — SOCIAL WORK
Attempted to meet with the patient who was sleeping soundly  Met with her fiance, Rodolfo Castillo, to complete assessment and explain role of CM  The patient lives with her two sons ages 16 and 12 in a multi level town house  There are 3 PAYTON and a full flight to the second floor  Patient has powder room on first floor and bedroom and bath on second floor  She is independent with all care, homemaking and driving  Patient is on SSDB  She has a prescription plan and uses [facility]  The patient owns a single point cane and has not had HHC or past MH or D&A treatment  Patient has no Advance Directive and her mother, Janet [place] Sheila, [phone]  CM reviewed d/c planning process including the following: identifying help at home, patient preference for d/c planning needs, Discharge Lounge, [facility] Meds to Bed program, availability of treatment team to discuss questions or concerns patient and/or family may have regarding understanding medications and recognizing signs and symptoms once discharged  CM also encouraged patient to follow up with all recommended appointments after discharge  Patient advised of importance for patient and family to participate in managing patients medical well being  Patient/caregiver received discharge checklist  Content reviewed  Patient/caregiver encouraged to participate in discharge plan of care prior to discharge home

## 2018-05-24 NOTE — ASSESSMENT & PLAN NOTE
Patient has a history of migraines with aura and photosensitive, states the current type of headache different  on Topamax amitriptyline Cymbalta  Tylenol p r n    Imaging study negative for acute intracranial pathology  Neurology consult  Continue aspirin

## 2018-05-24 NOTE — ED PROVIDER NOTES
History  Chief Complaint   Patient presents with    Headache     not feeling well all day, dizzy and syncope reported at home, pt reports facial pain and headache, chest tightness, pt reports vision spotty in left eye, left toes "are tingly"     This is a 49-year-old female that presents today with a headache and multiple other complaints  Patient states she has been feeling not herself today  States around 930 she felt dizzy or lightheaded and has a left-sided headache  She states this is not the worst headache of her life  It was not sudden in onset  She also states she has chest tightness  When she experiences headache she lost vision of her left eye and then syncopized  She states positive loss of consciousness  She did not hit her head  She still has some blurry vision of the left eye  History of migraines with aura which is photosensitivity along with visual changes but states this is completely different her from her normal migraine  States she has some left-sided facial pain  Headache feels sharp in nature no neck pain  No weakness of her upper lower extremities  States she noticed some tingling of her left toes  States no trauma  No shortness of breath  She has vomited and currently feels nauseous  Patient states she has been tested for lupus and she is currently undetermined  She has history of fibromyalgia and PTS D  She has chronic anemia and gets            Prior to Admission Medications   Prescriptions Last Dose Informant Patient Reported? Taking?    Black Cohosh 40 MG CAPS 5/23/2018 at Unknown time Self Yes Yes   Sig: Take 40 mg by mouth daily   Cholecalciferol (VITAMIN D-3 PO) 5/23/2018 at Unknown time Self Yes Yes   Sig: Take 2,000 Units by mouth 2 (two) times a day     DULoxetine (CYMBALTA) 30 mg delayed release capsule 5/23/2018 at Unknown time Self Yes Yes   Sig: Take 90 mg by mouth daily     MELATONIN GUMMIES PO 5/23/2018 at Unknown time Self Yes Yes   Sig: Take 15 mg by mouth daily at bedtime   Melatonin-FERMIN-Valerian 6-30-50 MG CAPS 5/23/2018 at Unknown time Self Yes Yes   Sig: Take by mouth   SUMAtriptan (IMITREX) 50 mg tablet 5/23/2018 at Unknown time Self Yes Yes   Sig: Take 50 mg by mouth once as needed for migraine  SUMAtriptan Succinate (SUMAVEL DOSEPRO) 6 MG/0 5ML SOTJ 5/23/2018 at Unknown time Self Yes Yes   Sig: Inject 6 mg under the skin as needed     amitriptyline (ELAVIL) 25 mg tablet 5/23/2018 at Unknown time Self Yes Yes   Sig: Take 50 mg by mouth daily at bedtime     dicyclomine (BENTYL) 10 mg capsule 5/23/2018 at Unknown time Self Yes Yes   Sig: Take 20 mg by mouth as needed     gabapentin (NEURONTIN) 300 mg capsule 5/23/2018 at Unknown time Self Yes Yes   Sig: Take 300 mg by mouth 3 (three) times a day  hydroxychloroquine (PLAQUENIL) 200 mg tablet 5/23/2018 at Unknown time Self Yes Yes   Sig: Take 200 mg by mouth 2 (two) times a day     prazosin (MINIPRESS) 1 mg capsule 5/23/2018 at Unknown time Self Yes Yes   Sig: Take 2 mg by mouth 2 (two) times a day     promethazine (PHENERGAN) 25 mg tablet 5/23/2018 at Unknown time Self Yes Yes   Sig: Take 25 mg by mouth every 6 (six) hours as needed for nausea or vomiting  topiramate (TOPAMAX) 25 mg sprinkle capsule 5/23/2018 at Unknown time Self Yes Yes   Sig: Take 25 mg by mouth 2 (two) times a day        Facility-Administered Medications: None       Past Medical History:   Diagnosis Date    Anxiety     B12 deficiency     Bipolar disorder (HCC)     Fibromyalgia     Fibromyalgia, primary     Hypertension     Iron deficiency anemia     Irritable bowel syndrome     Lupus     Migraines     Psychiatric disorder     PTSD (post-traumatic stress disorder)     Rheumatoid arthritis ([address] )        Past Surgical History:   Procedure Laterality Date    CHOLECYSTECTOMY      ENDOMETRIAL ABLATION      GASTRIC BYPASS      HYSTERECTOMY      ND KNEE SCOPE,MED/LAT MENISECTOMY Left 8/18/2017    Procedure: KNEE DIAGNOSTIC ARTHROSCOPY;  ANTEROMEDIAL PLICA  RESECTION;  Surgeon: Amira Esparza MD;  Location: [facility] OR;  Service: Orthopedics    WISDOM TOOTH EXTRACTION         Family History   Problem Relation Age of Onset    No Known Problems Mother     Heart disease Father     Hypertension Father     No Known Problems Sister      I have reviewed and agree with the history as documented  Social History   Substance Use Topics    Smoking status: Never Smoker    Smokeless tobacco: Never Used    Alcohol use No        Review of Systems   Constitutional: Positive for fatigue  Negative for diaphoresis and fever  HENT: Negative  Respiratory: Negative  Negative for cough, shortness of breath and wheezing  Cardiovascular: Positive for chest pain  Negative for palpitations and leg swelling  Gastrointestinal: Negative for abdominal distention, abdominal pain, nausea and vomiting  Genitourinary: Negative  Musculoskeletal: Negative  Skin: Negative  Neurological: Positive for dizziness, syncope and headaches  Psychiatric/Behavioral: Negative  All other systems reviewed and are negative  Physical Exam  ED Triage Vitals [05/23/18 2209]   Temperature Pulse Respirations Blood Pressure SpO2   97 6 °F (36 4 °C) 72 16 (!) 181/107 97 %      Temp Source Heart Rate Source Patient Position - Orthostatic VS BP Location FiO2 (%)   Tympanic Monitor Sitting Left arm --      Pain Score       7           Orthostatic Vital Signs  Vitals:    05/24/18 1900 05/24/18 2212 05/24/18 2357 05/25/18 0301   BP: 107/55 119/72 104/51 108/60   Pulse: 81  101 83   Patient Position - Orthostatic VS:   Lying Lying       Physical Exam   Constitutional: She is oriented to person, place, and time  She appears well-developed and well-nourished  No distress  HENT:   Head: Normocephalic and atraumatic     Nose: Nose normal    Mouth/Throat: Oropharynx is clear and moist    Chipped tooth due to syncope    Eyes: Conjunctivae and EOM are normal  Pupils are equal, round, and reactive to light  Normal visual fields    Neck: Normal range of motion  Neck supple  Cardiovascular: Normal rate, regular rhythm and normal heart sounds  No murmur heard  Pulmonary/Chest: Effort normal and breath sounds normal    Abdominal: Soft  Bowel sounds are normal  She exhibits no distension  There is no tenderness  There is no rebound and no guarding  Musculoskeletal: Normal range of motion  She exhibits no edema, tenderness or deformity  Neurological: She is alert and oriented to person, place, and time  Skin: Skin is warm and dry  She is not diaphoretic  Psychiatric: She has a normal mood and affect  Vitals reviewed        ED Medications  Medications   amitriptyline (ELAVIL) tablet 50 mg (50 mg Oral Given 5/24/18 2213)   cholecalciferol (VITAMIN D3) tablet 2,000 Units (2,000 Units Oral Given 5/24/18 1714)   dicyclomine (BENTYL) capsule 20 mg (not administered)   DULoxetine (CYMBALTA) delayed release capsule 90 mg (90 mg Oral Given 5/24/18 2212)   gabapentin (NEURONTIN) capsule 300 mg (300 mg Oral Given 5/24/18 2208)   hydroxychloroquine (PLAQUENIL) tablet 200 mg (200 mg Oral Given 5/24/18 1715)   melatonin tablet 9 mg (9 mg Oral Given 5/24/18 2208)   prazosin (MINIPRESS) capsule 2 mg (2 mg Oral Given 5/24/18 2212)   SUMAtriptan (IMITREX) tablet 50 mg (50 mg Oral Given 5/24/18 1334)   topiramate (TOPAMAX) sprinkle capsule 25 mg (25 mg Oral Given 5/24/18 1714)   enoxaparin (LOVENOX) subcutaneous injection 40 mg (40 mg Subcutaneous Given 5/24/18 0804)   docusate sodium (COLACE) capsule 100 mg (100 mg Oral Given 5/24/18 1714)   senna (SENOKOT) tablet 8 6 mg (8 6 mg Oral Not Given 5/24/18 0802)   polyethylene glycol (MIRALAX) packet 17 g (not administered)   ondansetron (ZOFRAN) injection 4 mg (not administered)   acetaminophen (TYLENOL) tablet 650 mg (650 mg Oral Given 5/24/18 1808)   labetalol (NORMODYNE) injection 10 mg (not administered)   aspirin chewable tablet 81 mg (81 mg Oral Given 5/24/18 0802)   diphenhydrAMINE (BENADRYL) injection 25 mg (25 mg Intravenous Given 5/24/18 0003)   metoclopramide (REGLAN) injection 10 mg (10 mg Intravenous Given 5/24/18 0004)   sodium chloride 0 9 % bolus 1,000 mL (1,000 mL Intravenous New Bag 5/24/18 0007)   acetaminophen (TYLENOL) tablet 650 mg (650 mg Oral Given 5/23/18 2333)   iohexol (OMNIPAQUE) 350 MG/ML injection (MULTI-DOSE) 85 mL (85 mL Intravenous Given 5/24/18 0303)   ketorolac (TORADOL) injection 15 mg (15 mg Intravenous Given 5/24/18 1920)       Diagnostic Studies  Results Reviewed     Procedure Component Value Units Date/Time    Troponin I [[id number]]  (Normal) Collected:  05/24/18 0532    Lab Status:  Final result Specimen:  Blood from Arm, Right Updated:  05/24/18 0639     Troponin I <0 02 ng/mL     Narrative:         [employer] Chemistry analyzer 99% cutoff is > 0 04 ng/mL in network labs    o cTnI 99% cutoff is useful only when applied to patients in the clinical setting of myocardial ischemia  o cTnI 99% cutoff should be interpreted in the context of clinical history, ECG findings and possibly cardiac imaging to establish correct diagnosis  o cTnI 99% cutoff may be suggestive but clearly not indicative of a coronary event without the clinical setting of myocardial ischemia      Comprehensive metabolic panel [[id number]]  (Abnormal) Collected:  05/23/18 2336    Lab Status:  Final result Specimen:  Blood from Arm, Left Updated:  05/24/18 0012     Sodium 138 mmol/L      Potassium 4 9 mmol/L      Chloride 107 mmol/L      CO2 26 mmol/L      Anion Gap 5 mmol/L      BUN 12 mg/dL      Creatinine 1 07 mg/dL      Glucose 84 mg/dL      Calcium 9 6 mg/dL      AST 32 U/L      ALT 30 U/L      Alkaline Phosphatase 196 (H) U/L      Total Protein 7 5 g/dL      Albumin 4 1 g/dL      Total Bilirubin 0 42 mg/dL      eGFR 61 ml/min/1 73sq m     Narrative:         National Kidney Disease Education Program recommendations are as follows:  GFR calculation is accurate only with a steady state creatinine  Chronic Kidney disease less than 60 ml/min/1 73 sq  meters  Kidney failure less than 15 ml/min/1 73 sq  meters  Troponin I [[id number]]  (Normal) Collected:  05/23/18 2336    Lab Status:  Final result Specimen:  Blood from Arm, Left Updated:  05/24/18 0010     Troponin I <0 02 ng/mL     Narrative:         [employer] Chemistry analyzer 99% cutoff is > 0 04 ng/mL in network labs    o cTnI 99% cutoff is useful only when applied to patients in the clinical setting of myocardial ischemia  o cTnI 99% cutoff should be interpreted in the context of clinical history, ECG findings and possibly cardiac imaging to establish correct diagnosis  o cTnI 99% cutoff may be suggestive but clearly not indicative of a coronary event without the clinical setting of myocardial ischemia      Protime-INR [[id number]]  (Normal) Collected:  05/23/18 2336    Lab Status:  Final result Specimen:  Blood from Arm, Left Updated:  05/24/18 0003     Protime 12 8 seconds      INR 0 95    APTT [[id number]]  (Normal) Collected:  05/23/18 2336    Lab Status:  Final result Specimen:  Blood from Arm, Left Updated:  05/24/18 0003     PTT 26 seconds     CBC and differential [[id number]]  (Abnormal) Collected:  05/23/18 2336    Lab Status:  Final result Specimen:  Blood from Arm, Left Updated:  05/23/18 5437     WBC 5 19 Thousand/uL      RBC 4 66 Million/uL      Hemoglobin 13 5 g/dL      Hematocrit 40 5 %      MCV 87 fL      MCH 29 0 pg      MCHC 33 3 g/dL      RDW 15 6 (H) %      MPV 11 0 fL      Platelets 376 Thousands/uL      nRBC 0 /100 WBCs      Neutrophils Relative 71 %      Lymphocytes Relative 23 %      Monocytes Relative 4 %      Eosinophils Relative 1 %      Basophils Relative 0 %      Neutrophils Absolute 3 70 Thousands/µL      Lymphocytes Absolute 1 18 Thousands/µL      Monocytes Absolute 0 23 Thousand/µL      Eosinophils Absolute 0 05 Thousand/µL      Basophils Absolute 0 02 Thousands/µL RADIOLOGY RESULTS   Final Result by  (05/24 1246)      XR chest 2 views   Final Result by Sirisha Gerardo MD (05/24 0800)      No acute cardiopulmonary disease  Workstation performed: [id number]         CTA head and neck with and without contrast   Final Result by Grayson Osgood, MD (05/24 0202)      1  No acute intracranial hemorrhage, midline shift, or mass effect  2   No hemodynamically severe stenosis of the arteries of the neck  3   No high-grade proximal stenosis of the visualized [place]  4   No aneurysm or arteriovenous malformation  Workstation performed: [id number]         MRI brain wo contrast    (Results Pending)         Procedures  Procedures      Phone Consults  ED Phone Contact    ED Course  ED Course as of May 25 0649   Wed May 23, 2018   2330 Patient is a difficult IV insertion obtained a 20 gauge and left AC obtain blood will send off and then get a CT    2356 Discussed with patient regarding admission for syncope workup and possible TIA     Thu May 24, 2018   0038 Patient lost her IV again infiltrated  Obtained another long 20 gauge and right AC via ultrasound                                MDM  CritCare Time    Disposition  Final diagnoses:   Syncope and collapse   Headache     Time reflects when diagnosis was documented in both MDM as applicable and the Disposition within this note     Time User Action Codes Description Comment    5/24/2018  4:01 AM Ayla [place] Add [R55] Syncope and collapse     5/24/2018  4:01 AM Ayla [place] Add [R51] Headache     5/24/2018  4:34 AM Nicolas Trang Add [H53 9] Visual disturbance       ED Disposition     ED Disposition Condition Comment    Admit  Case was discussed with RODOLFO and the patient's admission status was agreed to be Admission Status: inpatient status to the service of Dr Olivia Millan          Follow-up Information    None         Current Discharge Medication List      CONTINUE these medications which have NOT CHANGED    Details   amitriptyline (ELAVIL) 25 mg tablet Take 50 mg by mouth daily at bedtime        Black Cohosh 40 MG CAPS Take 40 mg by mouth daily      Cholecalciferol (VITAMIN D-3 PO) Take 2,000 Units by mouth 2 (two) times a day        dicyclomine (BENTYL) 10 mg capsule Take 20 mg by mouth as needed        DULoxetine (CYMBALTA) 30 mg delayed release capsule Take 90 mg by mouth daily        gabapentin (NEURONTIN) 300 mg capsule Take 300 mg by mouth 3 (three) times a day  hydroxychloroquine (PLAQUENIL) 200 mg tablet Take 200 mg by mouth 2 (two) times a day        MELATONIN GUMMIES PO Take 15 mg by mouth daily at bedtime      Melatonin-FERMIN-Valerian 6-30-50 MG CAPS Take by mouth      prazosin (MINIPRESS) 1 mg capsule Take 2 mg by mouth 2 (two) times a day        promethazine (PHENERGAN) 25 mg tablet Take 25 mg by mouth every 6 (six) hours as needed for nausea or vomiting  SUMAtriptan (IMITREX) 50 mg tablet Take 50 mg by mouth once as needed for migraine  SUMAtriptan Succinate (SUMAVEL DOSEPRO) 6 MG/0 5ML SOTJ Inject 6 mg under the skin as needed        topiramate (TOPAMAX) 25 mg sprinkle capsule Take 25 mg by mouth 2 (two) times a day  No discharge procedures on file  ED Provider  Attending physically available and evaluated Babs Mckeon I managed the patient along with the ED Attending      Electronically Signed by         Jake Drummond MD  05/25/18 4884

## 2018-05-24 NOTE — PLAN OF CARE
CARDIOVASCULAR - ADULT     Maintains optimal cardiac output and hemodynamic stability Progressing     Absence of cardiac dysrhythmias or at baseline rhythm Progressing        DISCHARGE PLANNING     Discharge to home or other facility with appropriate resources Progressing        INFECTION - ADULT     Absence or prevention of progression during hospitalization Progressing     Absence of fever/infection during neutropenic period Progressing        Knowledge Deficit     Patient/family/caregiver demonstrates understanding of disease process, treatment plan, medications, and discharge instructions Progressing        NEUROSENSORY - ADULT     Achieves stable or improved neurological status Progressing        PAIN - ADULT     Verbalizes/displays adequate comfort level or baseline comfort level Progressing        Potential for Falls     Patient will remain free of falls Progressing        SAFETY ADULT     Maintain or return to baseline ADL function Progressing     Maintain or return mobility status to optimal level Progressing

## 2018-05-25 ENCOUNTER — APPOINTMENT (INPATIENT)
Dept: RADIOLOGY | Facility: HOSPITAL | Age: 48
DRG: 103 | End: 2018-05-25
Payer: MEDICARE

## 2018-05-25 VITALS
WEIGHT: 220.9 LBS | OXYGEN SATURATION: 95 % | BODY MASS INDEX: 39.14 KG/M2 | SYSTOLIC BLOOD PRESSURE: 111 MMHG | TEMPERATURE: 98.1 F | HEIGHT: 63 IN | HEART RATE: 69 BPM | DIASTOLIC BLOOD PRESSURE: 57 MMHG | RESPIRATION RATE: 18 BRPM

## 2018-05-25 LAB
CRP SERPL QL: <3 MG/L
ERYTHROCYTE [SEDIMENTATION RATE] IN BLOOD: 7 MM/HOUR (ref 0–20)

## 2018-05-25 PROCEDURE — 99239 HOSP IP/OBS DSCHRG MGMT >30: CPT | Performed by: FAMILY MEDICINE

## 2018-05-25 PROCEDURE — 99221 1ST HOSP IP/OBS SF/LOW 40: CPT | Performed by: INTERNAL MEDICINE

## 2018-05-25 PROCEDURE — 86140 C-REACTIVE PROTEIN: CPT | Performed by: PHYSICIAN ASSISTANT

## 2018-05-25 PROCEDURE — 85652 RBC SED RATE AUTOMATED: CPT | Performed by: PHYSICIAN ASSISTANT

## 2018-05-25 PROCEDURE — 93306 TTE W/DOPPLER COMPLETE: CPT | Performed by: INTERNAL MEDICINE

## 2018-05-25 PROCEDURE — 70551 MRI BRAIN STEM W/O DYE: CPT

## 2018-05-25 RX ADMIN — TOPIRAMATE 25 MG: 25 CAPSULE, COATED PELLETS ORAL at 18:07

## 2018-05-25 RX ADMIN — VITAMIN D, TAB 1000IU (100/BT) 2000 UNITS: 25 TAB at 18:07

## 2018-05-25 RX ADMIN — DOCUSATE SODIUM 100 MG: 100 CAPSULE, LIQUID FILLED ORAL at 09:04

## 2018-05-25 RX ADMIN — GABAPENTIN 300 MG: 300 CAPSULE ORAL at 09:04

## 2018-05-25 RX ADMIN — HYDROXYCHLOROQUINE SULFATE 200 MG: 200 TABLET, FILM COATED ORAL at 18:07

## 2018-05-25 RX ADMIN — TOPIRAMATE 25 MG: 25 CAPSULE, COATED PELLETS ORAL at 09:03

## 2018-05-25 RX ADMIN — GABAPENTIN 300 MG: 300 CAPSULE ORAL at 16:13

## 2018-05-25 RX ADMIN — HYDROXYCHLOROQUINE SULFATE 200 MG: 200 TABLET, FILM COATED ORAL at 09:05

## 2018-05-25 RX ADMIN — ASPIRIN 81 MG 81 MG: 81 TABLET ORAL at 09:04

## 2018-05-25 RX ADMIN — ENOXAPARIN SODIUM 40 MG: 40 INJECTION SUBCUTANEOUS at 09:04

## 2018-05-25 RX ADMIN — VITAMIN D, TAB 1000IU (100/BT) 2000 UNITS: 25 TAB at 09:04

## 2018-05-25 RX ADMIN — ACETAMINOPHEN 650 MG: 325 TABLET, FILM COATED ORAL at 14:00

## 2018-05-25 RX ADMIN — SUMATRIPTAN SUCCINATE 50 MG: 50 TABLET, FILM COATED ORAL at 11:49

## 2018-05-25 RX ADMIN — METOPROLOL TARTRATE 12.5 MG: 25 TABLET ORAL at 16:13

## 2018-05-25 NOTE — CONSULTS
Consultation - Cardiology   Annmarie Sanford 46 y o  female MRN: [id number]  Unit/Bed#: [facility] 371-49 Encounter: [id number]         Inpatient consult to Cardiology     Performed by  Jennifer Yaeñz by Howard Louise            Physician Requesting Consult: Stevenson Walker MD  Reason for Consult / Principal Problem: Abnormal Echo    Assessment:  Principal Problem:    Syncope and collapse  Active Problems:    History of gastric bypass    Accelerated hypertension    Rheumatoid arthritis ([address] )    Fibromyalgia    Class 2 obesity due to excess calories in adult    Headache      Plan  1  Dynamic LVOT obstruction - Peak gradient 80 mm Hg at rest  This is likely sec to hyperdynamic left ventricular systolic function and mid-cavitary obstruction sec to dehydration  Patient received IVF yesterday after the echo  Does not have any significant murmur on exam now  There was only mild LVH on echo (which could be sec to obesity, she is s/p gastric bypass)  Will just start lopressor 12 5 mg bid to improve her gradients  Her syncope was likely neurologic sec to vasovagal syncope secondary to Icepick headache as per neurology given her history of migraines and description of her symptoms  Her father  in the age of 79 sec to ? Heart problem  Family will obtain more history regarding the father but it sounds like he had ?afib  Will f/u patient and get a repeat echo as outpatient to see if gradients improved  Advised patient to be well hydrated  Hold prazosin  History of Present Illness   HPI: Annmarie Sanford is a 46y o  year old female who has a history of PTSD, Migraine, s/p Gastric bypass on IV venofer, RA/Lupus, fibromyalgia  Patient was admitted on 2018 night  Patient states around 9:30 p m  that day she felt very lightheaded and had a severe stabbing left-sided headache and left-sided facial pain    She also stated that her left eye vision was decreased at that time and she had an episode of syncope which lasted for around 2-3 minutes as per the patient's son  Operative episode of syncope she still had some decreased vision in the left eye and stated she saw some black spots in left eye  Patient denies any history of cardiac problems  Patient denies any chest pain, shortness of breath, palpitations  She denies any episodes of syncope in the past   Neurology evaluated the patient and suspected given her history of migraines she likely had ice pick headache and vasovagal syncope secondary to ice pick headaches  Patient had an echo done yesterday  She also received IV fluids yesterday  Unable to review echo PACs images, but echo report states her LV systolic function was hyperdynamic with EF 80%  There is dynamic obstruction at rest and not flow with peak gradient of 80 mm Hg  There was possible severe systolic anterior motion of the anterior leaflet  Hyperdynamic left ventricular systolic function and mid cavitary obstruction could have contributed to the overall intracavitary gradients  Cardiology consulted for this echo findings  Patient states her father had some irregular heart rhythm and used to carry some pills with him which he would take whenever he felt irregular rhythm and would go to the emergency department he would get shocked for it  She states he went to [place] got a procedure done and the rhythm was taken care of  He had no defibrillator or pacemaker  He  at the age of 66 suddenly  They thought it was a heart attack  She states he also had sublingual nitroglycerin tablets with him  She denies any history of cardiac problems in any of her other family members, denies any sudden cardiac death in the other family members        Review of Systems: as per HPI      Historical Information   Past Medical History:   Diagnosis Date    Anxiety     B12 deficiency     Bipolar disorder (HCC)     Fibromyalgia     Fibromyalgia, primary     Hypertension     Iron deficiency anemia     Irritable bowel syndrome     Lupus     Migraines     Psychiatric disorder     PTSD (post-traumatic stress disorder)     Rheumatoid arthritis (HCC)      Past Surgical History:   Procedure Laterality Date    CHOLECYSTECTOMY      ENDOMETRIAL ABLATION      GASTRIC BYPASS      HYSTERECTOMY      WA KNEE SCOPE,MED/LAT MENISECTOMY Left 8/18/2017    Procedure: KNEE DIAGNOSTIC ARTHROSCOPY;  ANTEROMEDIAL PLICA  RESECTION;  Surgeon: Sandy Santos MD;  Location:  MAIN OR;  Service: Orthopedics    WISDOM TOOTH EXTRACTION       History   Alcohol Use No     History   Drug Use No     History   Smoking Status    Never Smoker   Smokeless Tobacco    Never Used     Family History:   Family History   Problem Relation Age of Onset    No Known Problems Mother     Heart disease Father     Hypertension Father     No Known Problems Sister        Meds/Allergies   all current active meds have been reviewed and current meds:   Current Facility-Administered Medications   Medication Dose Route Frequency    acetaminophen (TYLENOL) tablet 650 mg  650 mg Oral Q6H PRN    amitriptyline (ELAVIL) tablet 50 mg  50 mg Oral HS    aspirin chewable tablet 81 mg  81 mg Oral Daily    cholecalciferol (VITAMIN D3) tablet 2,000 Units  2,000 Units Oral BID    dicyclomine (BENTYL) capsule 20 mg  20 mg Oral PRN    docusate sodium (COLACE) capsule 100 mg  100 mg Oral BID    DULoxetine (CYMBALTA) delayed release capsule 90 mg  90 mg Oral Daily    enoxaparin (LOVENOX) subcutaneous injection 40 mg  40 mg Subcutaneous Daily    gabapentin (NEURONTIN) capsule 300 mg  300 mg Oral TID    hydroxychloroquine (PLAQUENIL) tablet 200 mg  200 mg Oral BID    labetalol (NORMODYNE) injection 10 mg  10 mg Intravenous Q4H PRN    melatonin tablet 9 mg  9 mg Oral HS    metoprolol tartrate (LOPRESSOR) partial tablet 12 5 mg  12 5 mg Oral Q12H [address]    ondansetron (ZOFRAN) injection 4 mg  4 mg Intravenous Q6H PRN    polyethylene glycol (MIRALAX) packet 17 g  17 g Oral Daily PRN    senna (SENOKOT) tablet 8 6 mg  1 tablet Oral Daily    SUMAtriptan (IMITREX) tablet 50 mg  50 mg Oral Once PRN    topiramate (TOPAMAX) sprinkle capsule 25 mg  25 mg Oral BID          No Known Allergies    Objective   Vitals: Blood pressure 112/67, pulse 84, temperature 98 3 °F (36 8 °C), resp  rate 18, height 5' 3" (1 6 m), weight 100 kg (220 lb 14 4 oz), SpO2 98 %  , Body mass index is 39 13 kg/m² , Orthostatic Blood Pressures      Most Recent Value   Blood Pressure  112/67 filed at 05/25/2018 1148   Patient Position - Orthostatic VS  Lying filed at 05/25/2018 9567        Systolic (75RLU), KJF:106 , Min:95 , WZI:146     Diastolic (64YZG), JCO:77, Min:51, Max:72      Intake/Output Summary (Last 24 hours) at 05/25/18 1506  Last data filed at 05/25/18 1436   Gross per 24 hour   Intake              925 ml   Output              575 ml   Net              350 ml       Weight (last 2 days)     Date/Time   Weight    05/24/18 0511  100 (220 9)    05/23/18 2209  99 8 (220)              Invasive Devices     Peripheral Intravenous Line            Peripheral IV 05/24/18 Left Antecubital 1 day                  Physical Exam   Constitutional: She is oriented to person, place, and time  She appears well-developed  No distress  HENT:   Head: Normocephalic and atraumatic  Eyes: EOM are normal  Pupils are equal, round, and reactive to light  Neck: No JVD present  No thyromegaly present  Cardiovascular: Normal rate, regular rhythm and normal heart sounds  Exam reveals no gallop and no friction rub  No murmur heard  Pulmonary/Chest: She has no wheezes  She has no rales  Abdominal: Soft  Bowel sounds are normal  There is no tenderness  Musculoskeletal: She exhibits no edema or tenderness  Neurological: She is alert and oriented to person, place, and time  Skin: She is not diaphoretic         Laboratory Results:    Results from last 7 days  Lab Units 05/24/18  0532 05/23/18  2336   TROPONIN I ng/mL <0 02 <0 02       CBC with diff:   Results from last 7 days  Lab Units 18  0532 18  2336   WBC Thousand/uL 4 76 5 19   HEMOGLOBIN g/dL 13 4 13 5   HEMATOCRIT % 41 1 40 5   MCV fL 88 87   PLATELETS Thousands/uL 306 329   MCH pg 28 7 29 0   MCHC g/dL 32 6 33 3   RDW % 15 8* 15 6*   MPV fL 11 0 11 0   NRBC AUTO /100 WBCs  --  0         CMP:  Results from last 7 days  Lab Units 18  0532 18  2336   SODIUM mmol/L 139 138   POTASSIUM mmol/L 3 5 4 9   CHLORIDE mmol/L 107 107   CO2 mmol/L 24 26   ANION GAP mmol/L 8 5   BUN mg/dL 11 12   CREATININE mg/dL 1 05 1 07   GLUCOSE RANDOM mg/dL 81 84   CALCIUM mg/dL 9 2 9 6   AST U/L  --  32   ALT U/L  --  30   ALK PHOS U/L  --  196*   TOTAL PROTEIN g/dL  --  7 5   BILIRUBIN TOTAL mg/dL  --  0 42   EGFR ml/min/1 73sq m 62 61         BNP:  No results for input(s): BNP in the last 72 hours      Magnesium:   Results from last 7 days  Lab Units 18  0532   MAGNESIUM mg/dL 2 4       Coags:   Results from last 7 days  Lab Units 18  2336   PTT seconds 26   INR  0 95       TSH: No results found for: TSH  No results found for: TSH, Y2ZPNTA, S4HLFKV, THYROIDAB  Hemoglobin A1C   Results from last 7 days  Lab Units 18  0532   HEMOGLOBIN A1C % 5 4       Lipid Profile:   Lab Results   Component Value Date    CHOL 152 2018     Lab Results   Component Value Date    HDL 73 (H) 2018     Lab Results   Component Value Date    LDLCALC 68 2018     Lab Results   Component Value Date    TRIG 56 2018     No components found for: [place], [place]    Cardiac testing:   Results for orders placed during the hospital encounter of 18   Echo complete with contrast if indicated    Narrative [address]  [facility], [address] [facility]  [phone]    Transthoracic Echocardiogram  2D, M-mode, Doppler, and Color Doppler    Study date:  24-May-2018    Patient: Sotero Haley  MR number: [id number]  Account number: [de-identified]  : 1970  Age: 46 years  Gender: Female  Status: Inpatient  Location: Bedside  Height: 63 in  Weight: 219 6 lb  BP: 158/ 96 mmHg    Indications: Syncope    Diagnoses: R55  - Syncope and collapse    Sonographer:  ALLEN Seaman  Primary Physician:  Aislinn Toussaint  Referring Physician:  Allen Fritz MD  Group:  [facility]  Interpreting Physician:  Jefferson Gallardo MD    SUMMARY    LEFT VENTRICLE:  The cavity was small  Systolic function was hyperdynamic  Ejection fraction was estimated to be 80 %  There were no regional wall motion abnormalities  Wall thickness was mildly increased  There was mild concentric hypertrophy  There was dynamic obstruction at rest in the outflow tract, with a peak velocity of 450 cm/s and a peak gradient of 80 mmHg  Doppler parameters were consistent with abnormal left ventricular relaxation (grade 1 diastolic dysfunction)  MITRAL VALVE:  There was possible severe systolic anterior motion of the anterior leaflet  Image quality was not ideal for definitive diagnosis  Hyperdynamic left ventricular systolic function and mid-cavitary obstruction may have contributed to the  overall intracavitary gradients  There was trace regurgitation  HISTORY: PRIOR HISTORY: Obesity, Syncope,HTN,Fibromyalgia    PROCEDURE: The procedure was performed at the bedside  This was a routine study  The transthoracic approach was used  The study included complete 2D imaging, M-mode, complete spectral Doppler, and color Doppler  Image quality was good  LEFT VENTRICLE: The cavity was small  Systolic function was hyperdynamic  Ejection fraction was estimated to be 80 %  There were no regional wall motion abnormalities  Wall thickness was mildly increased  There was mild concentric  hypertrophy  DOPPLER: There was dynamic obstruction at rest in the outflow tract, with a peak velocity of 450 cm/s and a peak gradient of 80 mmHg   Doppler parameters were consistent with abnormal left ventricular relaxation (grade 1  diastolic dysfunction)  RIGHT VENTRICLE: The size was normal  Systolic function was normal  Wall thickness was normal     LEFT ATRIUM: Size was normal     RIGHT ATRIUM: Size was normal     MITRAL VALVE: Valve structure was normal  There was normal leaflet separation  There was possible severe systolic anterior motion of the anterior leaflet  Image quality was not ideal for definitive diagnosis  Hyperdynamic left ventricular  systolic function and mid-cavitary obstruction may have contributed to the overall intracavitary gradients  DOPPLER: The transmitral velocity was within the normal range  There was no evidence for stenosis  There was trace regurgitation  AORTIC VALVE: The valve was trileaflet  Leaflets exhibited normal thickness and normal cuspal separation  DOPPLER: Transaortic velocity was within the normal range  There was no evidence for stenosis  There was no regurgitation  TRICUSPID VALVE: The valve structure was normal  There was normal leaflet separation  DOPPLER: The transtricuspid velocity was within the normal range  There was no evidence for stenosis  There was no regurgitation  PULMONIC VALVE: Leaflets exhibited normal thickness, no calcification, and normal cuspal separation  DOPPLER: The transpulmonic velocity was within the normal range  There was no regurgitation  PERICARDIUM: There was no pericardial effusion  The pericardium was normal in appearance  AORTA: The root exhibited normal size      SYSTEM MEASUREMENT TABLES    2D  %FS: 43 81 %  Ao Diam: 3 04 cm  EDV(Teich): 51 25 ml  EF(Teich): 76 01 %  ESV(Teich): 12 29 ml  IVSd: 1 23 cm  LA Area: 16 13 cm2  LA Diam: 3 4 cm  LAAs A2C: 18 88 cm2  LAAs A4C: 17 06 cm2  LAESV A-L A2C: 60 86 ml  LAESV A-L A4C: 51 87 ml  LAESV Index (A-L): 28 42 ml/m2  LAESV MOD A2C: 53 85 ml  LAESV MOD A4C: 50 17 ml  LAESV(A-L): 57 41 ml  LALs A2C: 4 97 cm  LALs A4C: 4 76 cm  LVIDd: 3 51 cm  LVIDs: 1 97 cm  LVPWd: 1 08 cm  RA Area: 12 51 cm2  RVIDd: 2 92 cm  SV(Teich): 38 96 ml    CW  AV Vmax: 2 62 m/s  AV maxP 38 mmHg  RAP: 5 mmHg  TR Vmax: 2 65 m/s  TR maxP 1 mmHg    MM  TAPSE: 2 73 cm    PW  E': 0 13 m/s  E/E': 8 58  Lateral E': 0 14 m/s  MV A Dur: 101 5 ms  MV A Rowdy: 1 34 m/s  MV Dec [place]: 8 78 m/s2  MV DecT: 129 77 ms  MV E Rowdy: 1 14 m/s  MV E/A Ratio: 0 85  MV PHT: 37 63 ms  MVA By PHT: 5 85 cm2  RVSP: 33 1 mmHg    [employer] Accredited Echocardiography Laboratory    Prepared and electronically signed by    Cierra Reyes MD  Signed [id number] 08:01:26                 Imaging: I have personally reviewed pertinent reports  Cta Head And Neck With And Without Contrast    Result Date: 2018  Narrative: CTA NECK AND BRAIN WITH AND WITHOUT CONTRAST INDICATION: syncope, left sided headache, vomiting, weakness, visual changes in left eye COMPARISON:   CT of the head on 2015  TECHNIQUE:  Routine CT imaging of the Brain without contrast   Post contrast imaging was performed after administration of iodinated contrast through the neck and brain  Post contrast axial 0 625 mm images timed to opacify the arterial system  3D rendering was performed on an independent workstation  MIP reconstructions performed  Coronal reconstructions were performed of the noncontrast portion of the brain  Radiation dose length product (DLP) for this visit:  1411 mGy-cm  This examination, like all CT scans performed in the [facility], was performed utilizing techniques to minimize radiation dose exposure, including the use of iterative reconstruction and automated exposure control  IV Contrast:  100 mL Omnipaque 350 intravenous contrast  was administered intravenously without immediate adverse reaction  IMAGE QUALITY:   Diagnostic FINDINGS: NONCONTRAST BRAIN PARENCHYMA:  No intracranial mass, mass effect or midline shift  No acute intracranial hemorrhage  No CT signs of acute infarction  VENTRICLES AND EXTRA-AXIAL SPACES:  Normal for patient's age  VISUALIZED ORBITS AND PARANASAL SINUSES:  Unremarkable  CALVARIUM AND EXTRACRANIAL SOFT TISSUES:   Normal  CERVICAL VASCULATURE AORTIC ARCH AND GREAT VESSELS:  Normal aortic arch and great vessel origins  Normal visualized subclavian vessels  RIGHT VERTEBRAL ARTERY CERVICAL SEGMENT:  Evaluation of the origin limited due to streak artifact  The vessel is normal in caliber throughout the neck  LEFT VERTEBRAL ARTERY CERVICAL SEGMENT:  Normal origin  The vessel is normal in caliber throughout the neck  The left vertebral artery is dominant  RIGHT EXTRACRANIAL CAROTID SEGMENT:  Normal caliber common carotid artery  Normal bifurcation and cervical internal carotid artery  No stenosis or dissection  LEFT EXTRACRANIAL CAROTID SEGMENT:  Normal caliber common carotid artery  Normal bifurcation and cervical internal carotid artery  No stenosis or dissection  NASCET criteria was used to determine the degree of internal carotid artery diameter stenosis  INTRACRANIAL VASCULATURE INTERNAL CAROTID ARTERIES:  Normal enhancement of the intracranial portions of the internal carotid arteries  Normal ophthalmic artery origins  Normal ICA terminus  ANTERIOR CIRCULATION:  Symmetric A1 segments and anterior cerebral arteries with normal enhancement  Normal anterior communicating artery  MIDDLE CEREBRAL ARTERY CIRCULATION:  M1 segment and middle cerebral artery branches demonstrate normal enhancement bilaterally  DISTAL VERTEBRAL ARTERIES: The right distal vertebral arteries diminutive  Posterior inferior cerebellar artery origins are normal   The basilar artery arises predominantly from the left vertebral artery  Mary Sole BASILAR ARTERY:  Basilar artery is normal in caliber  Normal superior cerebellar arteries  POSTERIOR CEREBRAL ARTERIES: Both posterior cerebral arteries arises from the basilar tip  Both arteries demonstrate normal enhancement     Normal posterior communicating arteries  DURAL VENOUS SINUSES:  Normal  NON VASCULAR ANATOMY BONY STRUCTURES:  Mild degenerative changes  SOFT TISSUES OF THE NECK:  Unremarkable  THORACIC INLET:  Unremarkable  Impression: 1  No acute intracranial hemorrhage, midline shift, or mass effect  2   No hemodynamically severe stenosis of the arteries of the neck  3   No high-grade proximal stenosis of the visualized [place]  4   No aneurysm or arteriovenous malformation  Workstation performed: [id number]     Xr Chest 2 Views    Result Date: 5/24/2018  Narrative: CHEST INDICATION:   chest tightness  COMPARISON:  None EXAM PERFORMED/VIEWS:  XR CHEST PA & LATERAL  The frontal view was performed utilizing dual energy radiographic technique  Images: 4 FINDINGS: Cardiomediastinal silhouette appears unremarkable  The lungs are clear  No pneumothorax or pleural effusion  Osseous structures appear within normal limits for patient age  Impression: No acute cardiopulmonary disease  Workstation performed: [id number]     Radiology Results    Result Date: 5/24/2018  Narrative: Ordered by an unspecified provider  EKG reviewed personally: NSR  Telemetry reviewed personally: NSR  No significant arrhythmias  Counseling / Coordination of Care  Total floor / unit time spent today 45 minutes  Greater than 50% of total time was spent with the patient and / or family counseling and / or coordination of care

## 2018-05-26 NOTE — DISCHARGE SUMMARY
Discharge Summary - [facility] Internal Medicine    Patient Information: Babs Mckeon 46 y o  female MRN: [id number]  Unit/Bed#: [facility] 512-01 Encounter: [id number]    Discharging Physician / Practitioner: Larry Hopson MD  PCP: Lorena Trivedi MD  Admission Date: 5/23/2018  Discharge Date: 05/25/18    Disposition:     Home    Reason for Admission:  Syncope and visual disturbance    Discharge Diagnoses:     Principal Problem:    Syncope and collapse  Active Problems:    History of gastric bypass    Accelerated hypertension    Rheumatoid arthritis ([facility] [address] )    Fibromyalgia    Class 2 obesity due to excess calories in adult    Headache  Resolved Problems:    * No resolved hospital problems  *      Consultations During Hospital Stay:  neurology    Procedures Performed:     Echocardiogram-hyper dynamic LV function  Concern for left ventricular outflow tract obstruction    Significant Findings / Test Results:     MRI brain-no acute intracranial pathology  CTA head-no significant intracranial stenosis    Incidental Findings:   ·     Test Results Pending at Discharge (will require follow up): Outpatient Tests Requested:  · none    Complications:  none    Hospital Course:     Babs Mckeon is a 46 y o  female patient who originally presented to the hospital on 5/23/2018 due to feeling of lightheadedness associated with acute episode of severe stabbing left-sided headache and left-sided facial pain  Patient also had temporary left eye vision loss and subsequent syncopal episode without prodromal says signs  There was no evidence of any seizure and there was no bladder or bowel incontinence  Patient came to the hospital and the initial workup for CVA was negative in the emergency room and she was admitted to the floor  Patient was evaluated by Neurology and continued the stroke workup  It was felt her syncope is likely vasovagal in nature  Patient had the MRI which was unremarkable    As part of her TIA workup she had an echocardiogram which was abnormal   Cardiology evaluated the patient and recommended that she should be hydrated at all times and needs follow-up with Cardiology as an outpatient  There was no definite evidence of left ventricular outflow tract obstruction  Patient remained hemodynamically stable and afebrile  She did not have any further events of syncope and the importance of keeping hydrated was discussed with the patient in dot for details refer to the chart    Condition at Discharge: good     Discharge Day Visit / Exam:     Subjective:  Patient seen and examined  No further headache and she is feeling well  MRI came back negative  Patient was evaluated by Cardiology and she is agreeable to go home with outpatient follow-up  Vitals: Blood Pressure: 111/57 (05/25/18 1604)  Pulse: 69 (05/25/18 1604)  Temperature: 98 1 °F (36 7 °C) (05/25/18 1604)  Temp Source: Oral (05/25/18 1604)  Respirations: 18 (05/25/18 1604)  Height: 5' 3" (160 cm) (05/24/18 0511)  Weight - Scale: 100 kg (220 lb 14 4 oz) (05/24/18 0511)  SpO2: 95 % (05/25/18 1604)  Exam:   Physical Exam   Constitutional: She is oriented to person, place, and time  She appears well-developed and well-nourished  HENT:   Head: Normocephalic and atraumatic  Eyes: EOM are normal  Pupils are equal, round, and reactive to light  Neck: Normal range of motion  Neck supple  Cardiovascular: Normal rate and regular rhythm  Pulmonary/Chest: Effort normal and breath sounds normal    Abdominal: Soft  Bowel sounds are normal  She exhibits no distension  Musculoskeletal: Normal range of motion  She exhibits no edema  Neurological: She is alert and oriented to person, place, and time  Skin: Skin is warm and dry  Discussion with Family:   updated in the room    Discharge instructions/Information to patient and family:   See after visit summary for information provided to patient and family        Provisions for Follow-Up Care:  See after visit summary for information related to follow-up care and any pertinent home health orders  Planned Readmission: none     Discharge Statement:  I spent 45 minutes discharging the patient  This time was spent on the day of discharge  I had direct contact with the patient on the day of discharge  Greater than 50% of the total time was spent examining patient, answering all patient questions, arranging and discussing plan of care with patient as well as directly providing post-discharge instructions  Additional time then spent on discharge activities  Discharge Medications:  See after visit summary for reconciled discharge medications provided to patient and family        ** Please Note: This note has been constructed using a voice recognition system **

## 2018-07-26 PROBLEM — Q24.8 LEFT VENTRICULAR OUTFLOW OBSTRUCTION: Status: ACTIVE | Noted: 2018-07-26

## 2018-07-27 ENCOUNTER — OFFICE VISIT (OUTPATIENT)
Dept: CARDIOLOGY CLINIC | Facility: CLINIC | Age: 48
End: 2018-07-27
Payer: MEDICARE

## 2018-07-27 VITALS
WEIGHT: 222.8 LBS | HEART RATE: 80 BPM | BODY MASS INDEX: 39.48 KG/M2 | SYSTOLIC BLOOD PRESSURE: 110 MMHG | HEIGHT: 63 IN | DIASTOLIC BLOOD PRESSURE: 78 MMHG

## 2018-07-27 DIAGNOSIS — R55 SYNCOPE AND COLLAPSE: Primary | ICD-10-CM

## 2018-07-27 DIAGNOSIS — Q24.8 LEFT VENTRICULAR OUTFLOW OBSTRUCTION: ICD-10-CM

## 2018-07-27 DIAGNOSIS — I10 ESSENTIAL HYPERTENSION: ICD-10-CM

## 2018-07-27 PROCEDURE — 99213 OFFICE O/P EST LOW 20 MIN: CPT | Performed by: INTERNAL MEDICINE

## 2018-07-27 RX ORDER — METOPROLOL SUCCINATE 25 MG/1
25 TABLET, EXTENDED RELEASE ORAL DAILY
Qty: 90 TABLET | Refills: 3 | Status: SHIPPED | OUTPATIENT
Start: 2018-07-27 | End: 2018-07-27 | Stop reason: SDUPTHER

## 2018-07-27 RX ORDER — METOPROLOL SUCCINATE 25 MG/1
25 TABLET, EXTENDED RELEASE ORAL DAILY
Qty: 90 TABLET | Refills: 1 | Status: SHIPPED | OUTPATIENT
Start: 2018-07-27 | End: 2020-06-25 | Stop reason: SDUPTHER

## 2018-07-27 NOTE — PROGRESS NOTES
Cardiology Consultation     Oracio Bautista  [id number]  1970  [address]    Reason for visit: Syncope in May    Had LVOT obstruction on echo with mild LVH    1  Syncope and collapse     2  Essential hypertension     3  Left ventricular outflow obstruction         HPI: The patient is a 46 yr old female without remarkable PMH  She was admitted to [facility] with dizziness and syncope  She had an echo which showed LVOT obstruction (80 mg gradient) with EF of 80 and mild LVH  Siddhartha Glass She was hydrated and sent home on metoprolol which she took for one month  She does get palpitations and lightheadedness which occur at any time  She also gets a fullness in her chest which occurs at any time  She denies edema  She does get TAVAREZ  Siddhartha Glass She attributes this to wt gain    Patient Active Problem List   Diagnosis    Synovial plica of left knee    Low iron stores    Elevated alkaline phosphatase level    Syncope and collapse    History of gastric bypass    Essential hypertension    [facility]oid [facility] ([facility])    Fibromyalgia    Class 2 obesity due to excess calories in adult    Headache    Left ventricular outflow obstruction     Past Medical History:   Diagnosis Date    Anxiety     B12 deficiency     Bipolar disorder ([facility])     Fibromyalgia     Fibromyalgia, primary     Hypertension     Iron deficiency anemia     Irritable bowel syndrome     Lupus     Migraines     Psychiatric disorder     PTSD (post-traumatic stress disorder)     Rheumatoid arthritis ([facility] )      Social History     Social History    Marital status:      Spouse name: N/A    Number of children: N/A    Years of education: N/A     Occupational History    Not on file       Social History Main Topics    Smoking status: Never Smoker    Smokeless tobacco: Never Used    Alcohol use No    Drug use: No    Sexual activity: Not on file Other Topics Concern    Not on file     Social History Narrative    No narrative on file      Family History   Problem Relation Age of Onset    No Known Problems Mother     Heart disease Father     Hypertension Father     No Known Problems Sister      Past Surgical History:   Procedure Laterality Date    CHOLECYSTECTOMY      ENDOMETRIAL ABLATION      GASTRIC BYPASS      HYSTERECTOMY      NC KNEE SCOPE,MED/LAT MENISECTOMY Left 8/18/2017    Procedure: KNEE DIAGNOSTIC ARTHROSCOPY;  ANTEROMEDIAL PLICA  RESECTION;  Surgeon: Ren Montalvo MD;  Location:  MAIN OR;  Service: Orthopedics    WISDOM TOOTH EXTRACTION         Current Outpatient Prescriptions:     amitriptyline (ELAVIL) 25 mg tablet, Take 50 mg by mouth daily at bedtime  , Disp: , Rfl:     Cholecalciferol (VITAMIN D-3 PO), Take 2,000 Units by mouth 2 (two) times a day  , Disp: , Rfl:     dicyclomine (BENTYL) 10 mg capsule, Take 20 mg by mouth as needed  , Disp: , Rfl:     DULoxetine (CYMBALTA) 30 mg delayed release capsule, Take 90 mg by mouth daily  , Disp: , Rfl:     gabapentin (NEURONTIN) 300 mg capsule, Take 300 mg by mouth 3 (three) times a day , Disp: , Rfl:     hydroxychloroquine (PLAQUENIL) 200 mg tablet, Take 200 mg by mouth 2 (two) times a day  , Disp: , Rfl:     magnesium oxide (MAG-OX) 400 mg, Take 1 tablet (400 mg total) by mouth daily, Disp: 30 tablet, Rfl: 0    MELATONIN GUMMIES PO, Take 15 mg by mouth daily at bedtime, Disp: , Rfl:     Riboflavin 100 MG TABS, Take 1 tablet (100 mg total) by mouth daily, Disp: 30 tablet, Rfl: 0    SUMAtriptan (IMITREX) 50 mg tablet, Take 50 mg by mouth once as needed for migraine  , Disp: , Rfl:     SUMAtriptan Succinate (SUMAVEL DOSEPRO) 6 MG/0 5ML SOTJ, Inject 6 mg under the skin as needed  , Disp: , Rfl:     topiramate (TOPAMAX) 25 mg sprinkle capsule, Take 25 mg by mouth 2 (two) times a day , Disp: , Rfl:     metoprolol tartrate (LOPRESSOR) 25 mg tablet, Take 0 5 tablets (12 5 mg total) by mouth every 12 (twelve) hours, Disp: 30 tablet, Rfl: 0  No Known Allergies  Vitals:    07/27/18 1308   BP: 110/78   BP Location: Right arm   Patient Position: Sitting   Cuff Size: Large   Pulse: 80   Weight: 101 kg (222 lb 12 8 oz)   Height: 5' 3" (1 6 m)       Review of Systems:  Review of Systems   Constitutional: Positive for fatigue  Respiratory: Positive for shortness of breath  Negative for cough  Cardiovascular: Positive for palpitations  Negative for chest pain and leg swelling  Gastrointestinal: Negative for anal bleeding, blood in stool, constipation and diarrhea  Genitourinary: Negative for frequency  Musculoskeletal: Positive for arthralgias and back pain  Neurological: Positive for light-headedness  Psychiatric/Behavioral: The patient is nervous/anxious  Physical Exam:  Vitals:    07/27/18 1308   BP: 110/78   BP Location: Right arm   Patient Position: Sitting   Cuff Size: Large   Pulse: 80   Weight: 101 kg (222 lb 12 8 oz)   Height: 5' 3" (1 6 m)       Physical Exam   Constitutional: She appears well-developed and well-nourished  No distress  obese   HENT:   Head: Normocephalic and atraumatic  Mouth/Throat: No oropharyngeal exudate  Eyes: Conjunctivae are normal  No scleral icterus  Neck: Neck supple  Normal carotid pulses and no JVD present  Carotid bruit is not present  No thyromegaly present  Cardiovascular: Normal rate, regular rhythm, normal heart sounds and intact distal pulses  Exam reveals no gallop and no friction rub  No murmur heard  Pulmonary/Chest: Breath sounds normal  She has no wheezes  She has no rhonchi  She has no rales  Abdominal: Soft  She exhibits no mass  There is no hepatosplenomegaly  There is no tenderness  Musculoskeletal: She exhibits no edema  Discussion/Summary:  1  Syncope  Interestingly the patient had left ventricular outflow tract obstruction on her echo during the hospitalization    Her ventricle is hyperdynamic but she only had top normal wall thickness  This is not a pattern of hypertrophic cardiomyopathy  I cannot hear a murmur today and after hydration and on later assessment in the hospital they could not hear a murmur  I think she likely does not have fixed left ventricular outflow tract obstruction  At this point I will continue metoprolol but use a lower dose at metoprolol XL 25 mg daily at like to reassess her in a few months time with a limited echo to see if this left ventricular outflow tract obstruction is fixed  She was encouraged to drink plenty of fluids and modestly salt so as to avoid dehydration  She does have a history of some palpitations and dizziness which I doubt are a serious arrhythmia in light of normal left ventricular function  2  HTN-?able dx    Low normal BP on Prazosin    (used for night terrors)      FU 3 months with JAZMYNE      Sara Dee MD

## 2019-05-03 ENCOUNTER — OFFICE VISIT (OUTPATIENT)
Dept: HEMATOLOGY ONCOLOGY | Facility: CLINIC | Age: 49
End: 2019-05-03
Payer: COMMERCIAL

## 2019-05-03 VITALS
TEMPERATURE: 98.1 F | SYSTOLIC BLOOD PRESSURE: 140 MMHG | HEART RATE: 61 BPM | HEIGHT: 63 IN | DIASTOLIC BLOOD PRESSURE: 82 MMHG | WEIGHT: 199 LBS | BODY MASS INDEX: 35.26 KG/M2 | RESPIRATION RATE: 18 BRPM | OXYGEN SATURATION: 96 %

## 2019-05-03 DIAGNOSIS — R74.8 ELEVATED ALKALINE PHOSPHATASE LEVEL: ICD-10-CM

## 2019-05-03 DIAGNOSIS — R79.0 LOW IRON STORES: Primary | ICD-10-CM

## 2019-05-03 PROCEDURE — 99214 OFFICE O/P EST MOD 30 MIN: CPT | Performed by: INTERNAL MEDICINE

## 2019-05-03 RX ORDER — FERROUS SULFATE 325(65) MG
325 TABLET ORAL
COMMUNITY
End: 2020-02-23 | Stop reason: ALTCHOICE

## 2019-05-06 ENCOUNTER — TELEPHONE (OUTPATIENT)
Dept: CARDIOLOGY CLINIC | Facility: CLINIC | Age: 49
End: 2019-05-06

## 2019-05-07 ENCOUNTER — HOSPITAL ENCOUNTER (OUTPATIENT)
Dept: ULTRASOUND IMAGING | Facility: HOSPITAL | Age: 49
Discharge: HOME | End: 2019-05-07
Attending: INTERNAL MEDICINE
Payer: COMMERCIAL

## 2019-05-07 DIAGNOSIS — R74.8 ELEVATED ALKALINE PHOSPHATASE LEVEL: ICD-10-CM

## 2019-05-07 PROCEDURE — 76700 US EXAM ABDOM COMPLETE: CPT

## 2019-05-08 RX ORDER — SODIUM CHLORIDE 9 MG/ML
20 INJECTION, SOLUTION INTRAVENOUS ONCE
Status: COMPLETED | OUTPATIENT
Start: 2019-05-09 | End: 2019-05-09

## 2019-05-09 ENCOUNTER — HOSPITAL ENCOUNTER (OUTPATIENT)
Dept: INFUSION CENTER | Facility: HOSPITAL | Age: 49
Discharge: HOME | End: 2019-05-09
Payer: COMMERCIAL

## 2019-05-09 VITALS
HEART RATE: 84 BPM | DIASTOLIC BLOOD PRESSURE: 78 MMHG | SYSTOLIC BLOOD PRESSURE: 132 MMHG | RESPIRATION RATE: 18 BRPM | TEMPERATURE: 97.1 F

## 2019-05-09 PROCEDURE — 96367 TX/PROPH/DG ADDL SEQ IV INF: CPT

## 2019-05-09 PROCEDURE — 96365 THER/PROPH/DIAG IV INF INIT: CPT

## 2019-05-09 RX ADMIN — FERUMOXYTOL 510 MG: 510 INJECTION INTRAVENOUS at 14:30

## 2019-05-09 RX ADMIN — DIPHENHYDRAMINE HYDROCHLORIDE 25 MG: 50 INJECTION, SOLUTION INTRAMUSCULAR; INTRAVENOUS at 14:09

## 2019-05-09 RX ADMIN — SODIUM CHLORIDE 20 ML/HR: 0.9 INJECTION, SOLUTION INTRAVENOUS at 14:08

## 2019-05-09 NOTE — PLAN OF CARE
Problem: Potential for Falls  Goal: Patient will remain free of falls  Description  INTERVENTIONS:  - Assess patient frequently for physical needs  -  Identify cognitive and physical deficits and behaviors that affect risk of falls    -  [place] fall precautions as indicated by assessment   - Educate patient/family on patient safety including physical limitations  - Instruct patient to call for assistance with activity based on assessment  - Modify environment to reduce risk of injury  - Consider OT/PT consult to assist with strengthening/mobility  Outcome: Progressing

## 2019-05-13 ENCOUNTER — TELEPHONE (OUTPATIENT)
Dept: HEMATOLOGY ONCOLOGY | Facility: HOSPITAL | Age: 49
End: 2019-05-13

## 2019-05-13 DIAGNOSIS — Q24.8 LEFT VENTRICULAR OUTFLOW OBSTRUCTION: Primary | ICD-10-CM

## 2019-11-12 ENCOUNTER — TELEPHONE (OUTPATIENT)
Dept: HEMATOLOGY ONCOLOGY | Facility: CLINIC | Age: 49
End: 2019-11-12

## 2019-11-25 ENCOUNTER — HOSPITAL ENCOUNTER (EMERGENCY)
Facility: HOSPITAL | Age: 49
Discharge: HOME | End: 2019-11-25
Attending: EMERGENCY MEDICINE | Admitting: EMERGENCY MEDICINE
Payer: COMMERCIAL

## 2019-11-25 ENCOUNTER — OFFICE VISIT (OUTPATIENT)
Dept: URGENT CARE | Facility: CLINIC | Age: 49
End: 2019-11-25
Payer: COMMERCIAL

## 2019-11-25 ENCOUNTER — APPOINTMENT (EMERGENCY)
Dept: RADIOLOGY | Facility: HOSPITAL | Age: 49
End: 2019-11-25
Payer: COMMERCIAL

## 2019-11-25 VITALS
OXYGEN SATURATION: 96 % | RESPIRATION RATE: 18 BRPM | BODY MASS INDEX: 31.89 KG/M2 | SYSTOLIC BLOOD PRESSURE: 137 MMHG | DIASTOLIC BLOOD PRESSURE: 89 MMHG | HEART RATE: 75 BPM | WEIGHT: 180 LBS | TEMPERATURE: 97.8 F

## 2019-11-25 VITALS
OXYGEN SATURATION: 97 % | DIASTOLIC BLOOD PRESSURE: 92 MMHG | RESPIRATION RATE: 18 BRPM | TEMPERATURE: 97.9 F | SYSTOLIC BLOOD PRESSURE: 137 MMHG | HEART RATE: 77 BPM

## 2019-11-25 DIAGNOSIS — R00.2 PALPITATIONS WITH REGULAR CARDIAC RHYTHM: ICD-10-CM

## 2019-11-25 DIAGNOSIS — R07.9 CHEST PAIN, UNSPECIFIED TYPE: Primary | ICD-10-CM

## 2019-11-25 DIAGNOSIS — R53.83 FATIGUE, UNSPECIFIED TYPE: Primary | ICD-10-CM

## 2019-11-25 DIAGNOSIS — R00.2 PALPITATIONS: ICD-10-CM

## 2019-11-25 LAB
ALBUMIN SERPL BCP-MCNC: 4 G/DL (ref 3.5–5)
ALP SERPL-CCNC: 143 U/L (ref 46–116)
ALT SERPL W P-5'-P-CCNC: 106 U/L (ref 12–78)
ANION GAP SERPL CALCULATED.3IONS-SCNC: 6 MMOL/L (ref 4–13)
AST SERPL W P-5'-P-CCNC: 69 U/L (ref 5–45)
ATRIAL RATE: 74 BPM
ATRIAL RATE: 87 BPM
BASOPHILS # BLD AUTO: 0.03 THOUSANDS/ΜL (ref 0–0.1)
BASOPHILS NFR BLD AUTO: 1 % (ref 0–1)
BILIRUB SERPL-MCNC: 0.43 MG/DL (ref 0.2–1)
BUN SERPL-MCNC: 13 MG/DL (ref 5–25)
CALCIUM SERPL-MCNC: 9.2 MG/DL (ref 8.3–10.1)
CHLORIDE SERPL-SCNC: 107 MMOL/L (ref 100–108)
CO2 SERPL-SCNC: 26 MMOL/L (ref 21–32)
CREAT SERPL-MCNC: 1.01 MG/DL (ref 0.6–1.3)
EOSINOPHIL # BLD AUTO: 0.07 THOUSAND/ΜL (ref 0–0.61)
EOSINOPHIL NFR BLD AUTO: 1 % (ref 0–6)
ERYTHROCYTE [DISTWIDTH] IN BLOOD BY AUTOMATED COUNT: 12.9 % (ref 11.6–15.1)
GFR SERPL CREATININE-BSD FRML MDRD: 65 ML/MIN/1.73SQ M
GLUCOSE SERPL-MCNC: 144 MG/DL (ref 65–140)
HCT VFR BLD AUTO: 45 % (ref 34.8–46.1)
HGB BLD-MCNC: 15.2 G/DL (ref 11.5–15.4)
IMM GRANULOCYTES # BLD AUTO: 0.02 THOUSAND/UL (ref 0–0.2)
IMM GRANULOCYTES NFR BLD AUTO: 0 % (ref 0–2)
LYMPHOCYTES # BLD AUTO: 1.16 THOUSANDS/ΜL (ref 0.6–4.47)
LYMPHOCYTES NFR BLD AUTO: 23 % (ref 14–44)
MCH RBC QN AUTO: 32.4 PG (ref 26.8–34.3)
MCHC RBC AUTO-ENTMCNC: 33.8 G/DL (ref 31.4–37.4)
MCV RBC AUTO: 96 FL (ref 82–98)
MONOCYTES # BLD AUTO: 0.3 THOUSAND/ΜL (ref 0.17–1.22)
MONOCYTES NFR BLD AUTO: 6 % (ref 4–12)
NEUTROPHILS # BLD AUTO: 3.47 THOUSANDS/ΜL (ref 1.85–7.62)
NEUTS SEG NFR BLD AUTO: 69 % (ref 43–75)
NRBC BLD AUTO-RTO: 0 /100 WBCS
P AXIS: 48 DEGREES
PLATELET # BLD AUTO: 292 THOUSANDS/UL (ref 149–390)
PMV BLD AUTO: 10.4 FL (ref 8.9–12.7)
POTASSIUM SERPL-SCNC: 4 MMOL/L (ref 3.5–5.3)
PR INTERVAL: 134 MS
PROT SERPL-MCNC: 7.4 G/DL (ref 6.4–8.2)
QRS AXIS: -27 DEGREES
QRS AXIS: 5 DEGREES
QRSD INTERVAL: 74 MS
QRSD INTERVAL: 76 MS
QT INTERVAL: 346 MS
QT INTERVAL: 404 MS
QTC INTERVAL: 416 MS
QTC INTERVAL: 448 MS
RBC # BLD AUTO: 4.69 MILLION/UL (ref 3.81–5.12)
SODIUM SERPL-SCNC: 139 MMOL/L (ref 136–145)
T WAVE AXIS: 122 DEGREES
T WAVE AXIS: 46 DEGREES
TROPONIN I SERPL-MCNC: <0.02 NG/ML
TSH SERPL DL<=0.05 MIU/L-ACNC: 1.32 UIU/ML (ref 0.36–3.74)
VENTRICULAR RATE: 74 BPM
VENTRICULAR RATE: 87 BPM
WBC # BLD AUTO: 5.05 THOUSAND/UL (ref 4.31–10.16)

## 2019-11-25 PROCEDURE — 99285 EMERGENCY DEPT VISIT HI MDM: CPT

## 2019-11-25 PROCEDURE — 99285 EMERGENCY DEPT VISIT HI MDM: CPT | Performed by: EMERGENCY MEDICINE

## 2019-11-25 PROCEDURE — 99213 OFFICE O/P EST LOW 20 MIN: CPT | Performed by: NURSE PRACTITIONER

## 2019-11-25 PROCEDURE — 71046 X-RAY EXAM CHEST 2 VIEWS: CPT

## 2019-11-25 PROCEDURE — 93005 ELECTROCARDIOGRAM TRACING: CPT | Performed by: NURSE PRACTITIONER

## 2019-11-25 PROCEDURE — 36415 COLL VENOUS BLD VENIPUNCTURE: CPT

## 2019-11-25 PROCEDURE — 84484 ASSAY OF TROPONIN QUANT: CPT | Performed by: EMERGENCY MEDICINE

## 2019-11-25 PROCEDURE — 93005 ELECTROCARDIOGRAM TRACING: CPT

## 2019-11-25 PROCEDURE — 93010 ELECTROCARDIOGRAM REPORT: CPT | Performed by: INTERNAL MEDICINE

## 2019-11-25 PROCEDURE — 80053 COMPREHEN METABOLIC PANEL: CPT | Performed by: EMERGENCY MEDICINE

## 2019-11-25 PROCEDURE — 84443 ASSAY THYROID STIM HORMONE: CPT | Performed by: EMERGENCY MEDICINE

## 2019-11-25 PROCEDURE — 85025 COMPLETE CBC W/AUTO DIFF WBC: CPT | Performed by: EMERGENCY MEDICINE

## 2019-11-25 NOTE — PROGRESS NOTES
[address] Now        NAME: Rebecca Iniguez is a 44 y o  female  : 1970    MRN: [id number]  DATE: 2019  TIME: 6:30 PM    Assessment and Plan   Fatigue, unspecified type [R53 83]  1  Fatigue, unspecified type  POCT ECG   2  Palpitations with regular cardiac rhythm  Ambulatory Referral to Emergency Medicine     ekg done in office   Normal sinus rhythm with premature atrial complexes  Low voltage qs  Non specific t wave abnormality    Pt declines ems transport -   Will be coming by private vehicle to [facility]  No asa or nitro given in office  Patient Instructions     Follow up with PCP in 3-5 days  Proceed to  ER if symptoms worsen  Chief Complaint     Chief Complaint   Patient presents with    Fatigue     PT C/o not feeling well x1 week Chest tightness, +sob, +fatigue Pain shoots up into left shoulder  History of Present Illness   Rebecca Iniguez presents to the clinic c/o    Pt states over the past week had increase in fatigue  Has noticed some increase in chest palpitations and flushed feeling  Dizzy at times and also nausea at times  Not related to eating or not  Has chest pressure at time and also some chest pain going into her left shoulder at times  Comes and goes  Denies any increase in stress or life changes  Happened once while driving - did pull over until it passed  Had something similar a year ago - had cardiac w/u in er - started on metoprolol  Review of Systems   Review of Systems   All other systems reviewed and are negative          Current Medications     Long-Term Medications   Medication Sig Dispense Refill    metoprolol succinate (TOPROL-XL) 25 mg 24 hr tablet Take 1 tablet (25 mg total) by mouth daily 90 tablet 1    ferrous sulfate 325 (65 Fe) mg tablet Take 325 mg by mouth daily with breakfast         Current Allergies     Allergies as of 2019    (No Known Allergies)            The following portions of the patient's history were reviewed and updated as appropriate: allergies, current medications, past family history, past medical history, past social history, past surgical history and problem list     Objective   /92 (BP Location: Right arm, Patient Position: Sitting, Cuff Size: Large)   Pulse 77   Temp 97 9 °F (36 6 °C) (Oral)   Resp 18   SpO2 97%        Physical Exam     Physical Exam   Constitutional: She is oriented to person, place, and time  Vital signs are normal  She appears well-developed and well-nourished  She is active and cooperative  HENT:   Head: Normocephalic and atraumatic  Eyes: Conjunctivae and lids are normal    Cardiovascular: Normal rate, regular rhythm, S1 normal, S2 normal and normal heart sounds  Pulmonary/Chest: Effort normal and breath sounds normal    Neurological: She is alert and oriented to person, place, and time  Skin: Skin is warm and dry  Psychiatric: She has a normal mood and affect  Her speech is normal and behavior is normal  Judgment and thought content normal  Cognition and memory are normal    Nursing note and vitals reviewed

## 2019-11-26 NOTE — ED ATTENDING ATTESTATION
11/25/2019  Reuben Doe DO saw and evaluated the patient  I have discussed the patient with the resident/non-physician practitioner and agree with the resident's/non-physician practitioner's findings, Plan of Care, and MDM as documented in the resident's/non-physician practitioner's note, except where noted  All available labs and Radiology studies were reviewed  I was present for key portions of any procedure(s) performed by the resident/non-physician practitioner and I was immediately available to provide assistance  At this point I agree with the current assessment done in the Emergency Department  I have conducted an independent evaluation of this patient a history and physical is as follows:    45 yo female presents for evaluation of 1 wk intermittent flushing, lightheadedness, palpitations and transient chest pressure usually lasting less than a minute however had an episode today which lasted 15 minutes at 1400h  Currently pt is asymptomatic  Denies vomiting, diaphoresis, leg pain or swelling  Imp: multiple c/o ddx broad plan: cardiac eval, reassess        ED Course         Critical Care Time  Procedures

## 2019-11-26 NOTE — DISCHARGE INSTRUCTIONS
You were seen today in the Emergency Department for chest pain, palpitations, and dizziness  Your workup included blood tests, an EKG, a chest xray, and a bedside ultrasound of your heart and revealed mild elevation of your liver enzymes  Please follow up with your Primary Care Provider in the next 1-2 days to discuss the symptoms that brought you to the emergency department and whether any further workup should be pursued as an outpatient  Please return to the Emergency Department if you have fevers, chills, chest pain, shortness of breath, are unable to eat or drink, or have any other concerning symptoms  I have attached an educational handout about your symptoms  Please look this over and let your nurse and/or me know if you have any further questions before you leave

## 2019-11-26 NOTE — ED PROVIDER NOTES
History  Chief Complaint   Patient presents with    Chest Pain     per patient, "i've been feeling really of for the past week, flushed and lightheaded, palpitations, and right now it feels like there is an elephant on my chest " denies sob     Rome Mcpherson is an 44y o  year old female with PMHx significant for bipolar disorder, fibromyalgia, rheumatoid arthritis, who presents to the ED today with several episodes of chest pain, shortness of breath, lightheadedness, nausea, and palpitations for the last 2 weeks  Patient says these episodes typically last 1 minutes or less and resolve on their own  They are not reliably so should with exertion, position, time of day, or any other stimulus  Nothing makes the episodes better or worse  She sometimes feels like there is pressure in her chest that is causing shortness of breath  She came to the emergency department today because 1 episode lasted 15 minutes, which is the usual for her, and she had pain radiating into the left side of her neck  She has never had anything like this before  She does not have any symptoms at this time  The patient denies fevers, chills, headaches, syncope, vertigo, nausea, vomiting, vision changes, hearing changes, cough, abdominal pain, changes in usual bowel movements, changes with urination, back pain, numbness or tingling, rashes, pain anywhere else in body  The patient has no sick contacts, recent travel history, new or changing medications, or immunocompromise  Over a month ago she stopped taking her metoprolol  Patient denies use of drugs or alcohol        History provided by:  Patient   used: No    Chest Pain   Pain location:  Substernal area  Pain quality: pressure    Pain radiates to the back: no    Pain severity:  Moderate  Onset quality:  Gradual  Duration:  15 minutes  Timing:  Intermittent  Progression:  Waxing and waning  Chronicity:  Recurrent  Context: no movement and no trauma    Relieved by:  Nothing  Worsened by:  Nothing tried  Ineffective treatments:  None tried  Associated symptoms: dizziness, nausea, palpitations and shortness of breath    Associated symptoms: no abdominal pain, no altered mental status, no back pain, no cough, no diaphoresis, no fatigue, no fever, no headache, no numbness, not vomiting and no weakness        Prior to Admission Medications   Prescriptions Last Dose Informant Patient Reported? Taking? SUMAtriptan (IMITREX) 50 mg tablet  Self Yes No   Sig: Take 50 mg by mouth once as needed for migraine  SUMAtriptan Succinate (SUMAVEL DOSEPRO) 6 MG/0 5ML SOTJ  Self Yes No   Sig: Inject 6 mg under the skin as needed     amitriptyline (ELAVIL) 25 mg tablet  Self Yes No   Sig: Take 50 mg by mouth daily at bedtime     dicyclomine (BENTYL) 10 mg capsule  Self Yes No   Sig: Take 20 mg by mouth as needed     ferrous sulfate 325 (65 Fe) mg tablet   Yes No   Sig: Take 325 mg by mouth daily with breakfast   hydroxychloroquine (PLAQUENIL) 200 mg tablet  Self Yes No   Sig: Take 200 mg by mouth 2 (two) times a day     metoprolol succinate (TOPROL-XL) 25 mg 24 hr tablet   No No   Sig: Take 1 tablet (25 mg total) by mouth daily   topiramate (TOPAMAX) 25 mg sprinkle capsule  Self Yes No   Sig: Take 25 mg by mouth 2 (two) times a day        Facility-Administered Medications: None       Past Medical History:   Diagnosis Date    Anxiety     B12 deficiency     Bipolar disorder (HCC)     Fibromyalgia     Fibromyalgia, primary     Hypertension     Iron deficiency anemia     Irritable bowel syndrome     Lupus (HCC)     Migraines     Psychiatric disorder     PTSD (post-traumatic stress disorder)     Rheumatoid arthritis (HCC)        Past Surgical History:   Procedure Laterality Date    CHOLECYSTECTOMY      ENDOMETRIAL ABLATION      GASTRIC BYPASS      HYSTERECTOMY      MA KNEE SCOPE,MED/LAT MENISECTOMY Left 8/18/2017    Procedure: KNEE DIAGNOSTIC ARTHROSCOPY;  ANTEROMEDIAL PLICA RESECTION;  Surgeon: Bev Worrell MD;  Location:  MAIN OR;  Service: Orthopedics    WISDOM TOOTH EXTRACTION         Family History   Problem Relation Age of Onset    No Known Problems Mother     Heart disease Father     Hypertension Father     No Known Problems Sister      I have reviewed and agree with the history as documented  Social History     Tobacco Use    Smoking status: Never Smoker    Smokeless tobacco: Never Used   Substance Use Topics    Alcohol use: No    Drug use: No        Review of Systems   Constitutional: Negative for activity change, appetite change, chills, diaphoresis, fatigue and fever  HENT: Negative for rhinorrhea and sore throat  Eyes: Negative for visual disturbance  Respiratory: Positive for shortness of breath  Negative for cough  Cardiovascular: Positive for chest pain and palpitations  Negative for leg swelling  Gastrointestinal: Positive for nausea  Negative for abdominal distention, abdominal pain, blood in stool, diarrhea and vomiting  Genitourinary: Negative for decreased urine volume, difficulty urinating, dysuria, flank pain and hematuria  Musculoskeletal: Negative for arthralgias, back pain, myalgias, neck pain and neck stiffness  Skin: Negative for rash and wound  Allergic/Immunologic: Negative for immunocompromised state  Neurological: Positive for dizziness and light-headedness  Negative for syncope, weakness, numbness and headaches  Hematological: Does not bruise/bleed easily  Psychiatric/Behavioral: Negative for confusion  All other systems reviewed and are negative        Physical Exam  ED Triage Vitals   Temperature Pulse Respirations Blood Pressure SpO2   11/25/19 1933 11/25/19 1933 11/25/19 1933 11/25/19 1933 11/25/19 1933   97 8 °F (36 6 °C) 93 18 152/94 98 %      Temp Source Heart Rate Source Patient Position - Orthostatic VS BP Location FiO2 (%)   11/25/19 1933 11/25/19 2052 11/25/19 1933 11/25/19 1933 --   Oral Monitor Lying Right arm       Pain Score       11/25/19 1933       5             Orthostatic Vital Signs  Vitals:    11/25/19 1933 11/25/19 1945 11/25/19 2052   BP: 152/94 152/94 137/89   Pulse: 93 84 75   Patient Position - Orthostatic VS: Lying  Lying       Physical Exam   Constitutional: She is oriented to person, place, and time  She appears well-developed and well-nourished  Non-toxic appearance  She does not appear ill  No distress  HENT:   Head: Normocephalic and atraumatic  Mouth/Throat: Oropharynx is clear and moist    Eyes: Conjunctivae are normal  No scleral icterus  Neck: Neck supple  No JVD present  No tracheal deviation present  Cardiovascular: Normal rate, regular rhythm and intact distal pulses  Pulmonary/Chest: Effort normal and breath sounds normal  No respiratory distress  She has no decreased breath sounds  She has no wheezes  She has no rhonchi  She has no rales  Abdominal: Soft  She exhibits no distension and no mass  There is no tenderness  There is no guarding  Musculoskeletal: She exhibits no edema or deformity  Right lower leg: She exhibits no tenderness and no edema  Left lower leg: She exhibits no tenderness and no edema  Neurological: She is alert and oriented to person, place, and time  Moves all extremities  Strength 5/5 and sensation intact in all extremities  Cranial nerves grossly intact  Skin: Skin is warm and dry  Capillary refill takes less than 2 seconds  No rash noted  She is not diaphoretic  Psychiatric: She has a normal mood and affect  Her behavior is normal    Nursing note and vitals reviewed        ED Medications  Medications - No data to display    Diagnostic Studies  Results Reviewed     Procedure Component Value Units Date/Time    TSH [[id number]]  (Normal) Collected:  11/25/19 1940    Lab Status:  Final result Specimen:  Blood from Arm, Right Updated:  11/25/19 2057     TSH 3RD GENERATON 1 320 uIU/mL     Narrative:       Patients undergoing fluorescein dye angiography may retain small amounts of fluorescein in the body for 48-72 hours post procedure  Samples containing fluorescein can produce falsely depressed TSH values  If the patient had this procedure,a specimen should be resubmitted post fluorescein clearance        Troponin I [[id number]]  (Normal) Collected:  11/25/19 1940    Lab Status:  Final result Specimen:  Blood from Arm, Right Updated:  11/25/19 2023     Troponin I <0 02 ng/mL     Comprehensive metabolic panel [[id number]]  (Abnormal) Collected:  11/25/19 1940    Lab Status:  Final result Specimen:  Blood from Arm, Right Updated:  11/25/19 2011     Sodium 139 mmol/L      Potassium 4 0 mmol/L      Chloride 107 mmol/L      CO2 26 mmol/L      ANION GAP 6 mmol/L      BUN 13 mg/dL      Creatinine 1 01 mg/dL      Glucose 144 mg/dL      Calcium 9 2 mg/dL      AST 69 U/L       U/L      Alkaline Phosphatase 143 U/L      Total Protein 7 4 g/dL      Albumin 4 0 g/dL      Total Bilirubin 0 43 mg/dL      eGFR 65 ml/min/1 73sq m     Narrative:       [place] guidelines for Chronic Kidney Disease (CKD):     Stage 1 with normal or high GFR (GFR > 90 mL/min/1 73 square meters)    Stage 2 Mild CKD (GFR = 60-89 mL/min/1 73 square meters)    Stage 3A Moderate CKD (GFR = 45-59 mL/min/1 73 square meters)    Stage 3B Moderate CKD (GFR = 30-44 mL/min/1 73 square meters)    Stage 4 Severe CKD (GFR = 15-29 mL/min/1 73 square meters)    Stage 5 End Stage CKD (GFR <15 mL/min/1 73 square meters)  Note: GFR calculation is accurate only with a steady state creatinine    CBC and differential [[id number]] Collected:  11/25/19 1940    Lab Status:  Final result Specimen:  Blood from Arm, Right Updated:  11/25/19 1950     WBC 5 05 Thousand/uL      RBC 4 69 Million/uL      Hemoglobin 15 2 g/dL      Hematocrit 45 0 %      MCV 96 fL      MCH 32 4 pg      MCHC 33 8 g/dL      RDW 12 9 %      MPV 10 4 fL      Platelets 763 Thousands/uL      nRBC 0 /100 WBCs      Neutrophils Relative 69 %      Immat GRANS % 0 %      Lymphocytes Relative 23 %      Monocytes Relative 6 %      Eosinophils Relative 1 %      Basophils Relative 1 %      Neutrophils Absolute 3 47 Thousands/µL      Immature Grans Absolute 0 02 Thousand/uL      Lymphocytes Absolute 1 16 Thousands/µL      Monocytes Absolute 0 30 Thousand/µL      Eosinophils Absolute 0 07 Thousand/µL      Basophils Absolute 0 03 Thousands/µL                  XR chest 2 views   ED Interpretation by Noreen Houston DO (11/25 2102)   No effusions, focal consolidations, fractures, pneumothorax  No significant changes from prior study  Procedures  Procedures        ED Course         HEART Risk Score      Most Recent Value   History  0 Filed at: 11/25/2019 2100   ECG  0 Filed at: 11/25/2019 2100   Age  1 Filed at: 11/25/2019 2100   Risk Factors  1 Filed at: 11/25/2019 2100   Troponin  0 Filed at: 11/25/2019 2100   Heart Score Risk Calculator   History  0 Filed at: 11/25/2019 2100   ECG  0 Filed at: 11/25/2019 2100   Age  1 Filed at: 11/25/2019 2100   Risk Factors  1 Filed at: 11/25/2019 2100   Troponin  0 Filed at: 11/25/2019 2100   HEART Score  2 Filed at: 11/25/2019 2100   HEART Score  2 Filed at: 11/25/2019 2100                            MDM  Number of Diagnoses or Management Options  Diagnosis management comments: The patient's EKG reveals nsr, rate 87, no PATYON/STD/T wave inversions, no blocks, QTc normal      The patient denies association with alcohol use, vomiting, eating, position, or pain radiating to the abdomen, or back  Pulses in extremities are palpable and symmetric  Breath sounds are present bilaterally and the patient is not respiratory distress  No syncope, unilateral leg swelling or tenderness, history or risk factors for blood clots (smoking, exogenous estrogen, smoking, recent surgery or immobility, trauma)  Episodes resolve spontaneously without intervention           Amount and/or Complexity of Data Reviewed  Clinical lab tests: ordered and reviewed  Tests in the radiology section of CPT®: ordered and reviewed  Tests in the medicine section of CPT®: ordered and reviewed  Review and summarize past medical records: yes  Discuss the patient with other providers: yes    Risk of Complications, Morbidity, and/or Mortality  Presenting problems: low  Diagnostic procedures: low  Management options: low    Patient Progress  Patient progress: stable      Disposition  Final diagnoses:   Chest pain, unspecified type   Palpitations     Time reflects when diagnosis was documented in both MDM as applicable and the Disposition within this note     Time User Action Codes Description Comment    11/25/2019  9:03 PM Alex Marcus [R07 9] Chest pain, unspecified type     11/25/2019  9:03 PM Alex Marcus [R00 2] Palpitations       ED Disposition     ED Disposition Condition Date/Time Comment    Discharge Stable Mon Nov 25, 2019  9:03 PM Lisseth Zavala discharge to home/self care  Return precautions given and questions answered  Follow-up Information     Follow up With Specialties Details Why Contact Info    Lindsey Jacome DO Family Medicine Schedule an appointment as soon as possible for a visit in 1 day To discuss chest pain and palpitations [facility] [place], [place] [phone]            Patient's Medications   Discharge Prescriptions    No medications on file     No discharge procedures on file  ED Provider  Attending physically available and evaluated Lisseth Zavala I managed the patient along with the ED Attending      Electronically Signed by         Meriam Curling, DO  11/25/19 8901

## 2019-12-10 ENCOUNTER — TELEPHONE (OUTPATIENT)
Dept: HEMATOLOGY ONCOLOGY | Facility: CLINIC | Age: 49
End: 2019-12-10

## 2019-12-10 NOTE — TELEPHONE ENCOUNTER
Patient called to cancel today's appt with Dr Guzman  Patient's son is in the ER  Patient will call back to reschedule

## 2019-12-17 ENCOUNTER — TELEPHONE (OUTPATIENT)
Dept: HEMATOLOGY ONCOLOGY | Facility: CLINIC | Age: 49
End: 2019-12-17

## 2019-12-17 NOTE — TELEPHONE ENCOUNTER
Spoke to [place] and told her  that her lab work from 11/25/19 anemia has not occurred and that iron stores are adequate

## 2019-12-18 ENCOUNTER — TELEPHONE (OUTPATIENT)
Dept: HEMATOLOGY ONCOLOGY | Facility: CLINIC | Age: 49
End: 2019-12-18

## 2020-01-06 ENCOUNTER — OFFICE VISIT (OUTPATIENT)
Dept: CARDIOLOGY CLINIC | Facility: CLINIC | Age: 50
End: 2020-01-06
Payer: COMMERCIAL

## 2020-01-06 VITALS
OXYGEN SATURATION: 100 % | HEIGHT: 62 IN | DIASTOLIC BLOOD PRESSURE: 70 MMHG | HEART RATE: 61 BPM | WEIGHT: 183 LBS | SYSTOLIC BLOOD PRESSURE: 120 MMHG | BODY MASS INDEX: 33.68 KG/M2

## 2020-01-06 DIAGNOSIS — R00.2 PALPITATIONS: Primary | ICD-10-CM

## 2020-01-06 DIAGNOSIS — R07.89 ATYPICAL CHEST PAIN: ICD-10-CM

## 2020-01-06 DIAGNOSIS — I10 ESSENTIAL HYPERTENSION: ICD-10-CM

## 2020-01-06 DIAGNOSIS — Q24.8 LEFT VENTRICULAR OUTFLOW OBSTRUCTION: ICD-10-CM

## 2020-01-06 PROCEDURE — 99214 OFFICE O/P EST MOD 30 MIN: CPT | Performed by: INTERNAL MEDICINE

## 2020-01-06 PROCEDURE — 3078F DIAST BP <80 MM HG: CPT | Performed by: INTERNAL MEDICINE

## 2020-01-06 PROCEDURE — 3074F SYST BP LT 130 MM HG: CPT | Performed by: INTERNAL MEDICINE

## 2020-01-06 RX ORDER — PRAZOSIN HYDROCHLORIDE 2 MG/1
CAPSULE ORAL
COMMUNITY
Start: 2019-12-20 | End: 2020-06-25 | Stop reason: SDUPTHER

## 2020-01-06 NOTE — PROGRESS NOTES
Cardiology Follow Up    Demetria Suero  1970  [id number]  [address]  [address]  [facility] [id number] [phone]    Reason for visit: way OD for 3 month FU for LVOT obstruction seen on echo last year after syncope  Also, she has HTN      1  Essential hypertension     2  Left ventricular outflow obstruction         Interval History: The patient returns complaining of rather frequent racing of her heart over the past few months    This generally lasts one minute but recently lasted longer and she came to the ER in November  Her rhythm was NSR  She had gone off her metoprolol prior to the Er visit    She states they are not as bad  She states they generally last less than 1 minutes    She did have chest pain on presentation    She describes this as tightness  She states that the tightness was associated with the racing heart beat    She denies edema  She denies exertional chest pain or SOB>   She does get lightheadedness but has not had recurrent syncope    Patient Active Problem List   Diagnosis    Synovial plica of left knee    Low iron stores    Elevated alkaline phosphatase level    Syncope and collapse    History of gastric bypass    Essential hypertension    Rheumatoid arthritis (HCC)    Fibromyalgia    Class 2 obesity due to excess calories in adult    Headache    Left ventricular outflow obstruction     Past Medical History:   Diagnosis Date    Anxiety     B12 deficiency     Bipolar disorder (HCC)     Fibromyalgia     Fibromyalgia, primary     Hypertension     Iron deficiency anemia     Irritable bowel syndrome     Lupus (HCC)     Migraines     Psychiatric disorder     PTSD (post-traumatic stress disorder)     Rheumatoid arthritis ([facility] [address] )      Social History     Socioeconomic History    Marital status:      Spouse name: Not on file    Number of children: Not on file    Years of education: Not on file    Highest education level: Not on file   Occupational History    Not on file   Social Needs    Financial resource strain: Not on file    Food insecurity:     Worry: Not on file     Inability: Not on file    Transportation needs:     Medical: Not on file     Non-medical: Not on file   Tobacco Use    Smoking status: Never Smoker    Smokeless tobacco: Never Used   Substance and Sexual Activity    Alcohol use: No    Drug use: No    Sexual activity: Not on file   Lifestyle    Physical activity:     Days per week: Not on file     Minutes per session: Not on file    Stress: Not on file   Relationships    Social connections:     Talks on phone: Not on file     Gets together: Not on file     Attends Muslim service: Not on file     Active member of club or organization: Not on file     Attends meetings of clubs or organizations: Not on file     Relationship status: Not on file    Intimate partner violence:     Fear of current or ex partner: Not on file     Emotionally abused: Not on file     Physically abused: Not on file     Forced sexual activity: Not on file   Other Topics Concern    Not on file   Social History Narrative    Not on file      Family History   Problem Relation Age of Onset    No Known Problems Mother     Heart disease Father     Hypertension Father     No Known Problems Sister     No Known Problems Maternal Aunt     Breast cancer Family      Past Surgical History:   Procedure Laterality Date     SECTION      CHOLECYSTECTOMY      ENDOMETRIAL ABLATION      GASTRIC BYPASS      HYSTERECTOMY      PA KNEE SCOPE,MED/LAT MENISECTOMY Left 2017    Procedure: KNEE DIAGNOSTIC ARTHROSCOPY;  ANTEROMEDIAL PLICA  RESECTION;  Surgeon: Monster Villasenor MD;  Location: [facility] OR;  Service: Orthopedics    TUBAL LIGATION      WISDOM TOOTH EXTRACTION         Current Outpatient Medications:     amitriptyline (ELAVIL) 25 mg tablet, Take 50 mg by mouth daily at bedtime  , Disp: , Rfl:     dicyclomine (BENTYL) 10 mg capsule, Take 20 mg by mouth as needed  , Disp: , Rfl:     ferrous sulfate 325 (65 Fe) mg tablet, Take 325 mg by mouth daily with breakfast, Disp: , Rfl:     metoprolol succinate (TOPROL-XL) 25 mg 24 hr tablet, Take 1 tablet (25 mg total) by mouth daily, Disp: 90 tablet, Rfl: 1    prazosin (MINIPRESS) 2 mg capsule, 2 caps daily, Disp: , Rfl:     SUMAtriptan Succinate (SUMAVEL DOSEPRO) 6 MG/0 5ML SOTJ, Inject 6 mg under the skin as needed  , Disp: , Rfl:     SUMAtriptan (IMITREX) 50 mg tablet, Take 50 mg by mouth once as needed for migraine  , Disp: , Rfl:   No Known Allergies    Review of Systems:  Review of Systems   Constitutional: Positive for unexpected weight change  Negative for activity change, appetite change and fatigue  Respiratory: Positive for chest tightness  Negative for cough, shortness of breath and wheezing  Cardiovascular: Positive for palpitations  Negative for leg swelling  Gastrointestinal: Negative for abdominal pain, blood in stool, constipation and diarrhea  Genitourinary: Negative for dysuria, frequency, hematuria and urgency  Musculoskeletal: Positive for arthralgias and back pain  Negative for gait problem and joint swelling  Neurological: Positive for dizziness and light-headedness  Negative for speech difficulty and headaches  Psychiatric/Behavioral: Negative for agitation, behavioral problems, confusion and decreased concentration  Physical Exam:  Vitals:    01/06/20 1509   BP: 120/70   BP Location: Right arm   Patient Position: Sitting   Cuff Size: Adult   Pulse: 61   SpO2: 100%   Weight: 83 kg (183 lb)   Height: 5' 2" (1 575 m)       Physical Exam   Constitutional: She is oriented to person, place, and time  She appears well-developed and well-nourished  No distress  Obese     HENT:   Head: Normocephalic  Mouth/Throat: Oropharynx is clear and moist  No oropharyngeal exudate     Eyes: Conjunctivae are normal  No scleral icterus  Neck: Neck supple  Normal carotid pulses and no JVD present  Carotid bruit is not present  No thyromegaly present  Cardiovascular: Normal rate, regular rhythm, normal heart sounds and intact distal pulses  Exam reveals no gallop and no friction rub  No murmur heard  Pulmonary/Chest: Breath sounds normal  She has no wheezes  She has no rales  Abdominal: Soft  She exhibits no mass  There is no hepatosplenomegaly  There is no tenderness  Musculoskeletal: She exhibits no edema, tenderness or deformity  Neurological: She is alert and oriented to person, place, and time  She has normal strength  No cranial nerve deficit or sensory deficit  Skin: Skin is warm and dry  No rash noted  No erythema  No pallor  Psychiatric: She has a normal mood and affect  Her behavior is normal  Judgment and thought content normal        Discussion/Summary:  1  Palpitations-possibly representing SVT by history  Patient had come off beta-blocker and may have been more prone to this  She will purchase a monitoring device such as an Apple watch or [employer] and record episodes     Continue beta-blocker for now  2  HTN-well controlled on low-dose metoprolol and prasozin  3  Left ventricular outflow tract obstruction  Seen at the time of syncope  Patient may have been somewhat volume depleted and had a hyperdynamic ventricle  Will order echocardiogram to make sure that she has no significant gradient at this time  4  Atypical chest pain  This appears to be associated with the palpitations and I feel may be the chest tightness that goes with a more rapid heartbeat    Reassured this does not appear to be related to coronary ischemia since she is able to walk without shortness of breath or chest discomfort      Fu by phone re: echo and hopefully she will give input re palps from monitor        Ziyad Tariq MD

## 2020-01-29 ENCOUNTER — OFFICE VISIT (OUTPATIENT)
Dept: URGENT CARE | Facility: CLINIC | Age: 50
End: 2020-01-29
Payer: COMMERCIAL

## 2020-01-29 VITALS
OXYGEN SATURATION: 99 % | HEART RATE: 73 BPM | DIASTOLIC BLOOD PRESSURE: 83 MMHG | RESPIRATION RATE: 20 BRPM | HEIGHT: 62 IN | SYSTOLIC BLOOD PRESSURE: 126 MMHG | TEMPERATURE: 97.8 F | WEIGHT: 183 LBS | BODY MASS INDEX: 33.68 KG/M2

## 2020-01-29 DIAGNOSIS — S61.211A LACERATION OF LEFT INDEX FINGER WITHOUT FOREIGN BODY WITHOUT DAMAGE TO NAIL, INITIAL ENCOUNTER: Primary | ICD-10-CM

## 2020-01-29 PROCEDURE — 12001 RPR S/N/AX/GEN/TRNK 2.5CM/<: CPT | Performed by: PHYSICIAN ASSISTANT

## 2020-01-29 PROCEDURE — 99212 OFFICE O/P EST SF 10 MIN: CPT | Performed by: PHYSICIAN ASSISTANT

## 2020-01-30 NOTE — PROGRESS NOTES
[facility]    NAME: Sven Bateman is a 44 y o  female  : 1970    MRN: [id number]  DATE: 2020  TIME: 7:23 PM    Assessment and Plan   Laceration of left index finger without foreign body without damage to nail, initial encounter [S61 211A]  1  Laceration of left index finger without foreign body without damage to nail, initial encounter       Nurse applied finger splint  Patient Instructions   Patient Instructions   Finger glued  Splint to keep from bending and of finger for a couple days  May elevate and do cold compresses over top of finger if soreness  Recommend not getting finger wet for approximately 24 hours  Allow glued to dissolve on own  Do not pick off  Watch for signs of infection which would include increased redness, pain, swelling, nasty discharge  Follow-up as needed  Chief Complaint     Chief Complaint   Patient presents with    Finger Laceration     Patient here with a laceration left hand index finger from a [employer] knife  History of Present Illness   Sven Bateman presents to the clinic c/o  12-year-old left hand dominant female cut her left index finger with a serrated bread knife this evening while making dinner  She was holding the bread in her left hand and using her right hand to hold the knife  Did clean wound and bandage but continued to bleed when she would take the bandage off  Please last Tdap was   Review of Systems   Review of Systems   Constitutional: Negative  Skin: Positive for wound         Current Medications     Long-Term Medications   Medication Sig Dispense Refill    ferrous sulfate 325 (65 Fe) mg tablet Take 325 mg by mouth daily with breakfast      metoprolol succinate (TOPROL-XL) 25 mg 24 hr tablet Take 1 tablet (25 mg total) by mouth daily 90 tablet 1       Current Allergies     Allergies as of 2020    (No Known Allergies)          The following portions of the patient's history were reviewed and updated as appropriate: allergies, current medications, past family history, past medical history, past social history, past surgical history and problem list   Past Medical History:   Diagnosis Date    Anxiety     B12 deficiency     [facility] ([facility] )     Fibromyalgia     Fibromyalgia, primary     Hypertension     Iron deficiency anemia     Irritable bowel syndrome     Lupus ([facility] )     Migraines     Psychiatric disorder     PTSD (post-traumatic stress disorder)     Rheumatoid arthritis ([facility] )      Past Surgical History:   Procedure Laterality Date     SECTION      CHOLECYSTECTOMY      ENDOMETRIAL ABLATION      GASTRIC BYPASS      HYSTERECTOMY      PA KNEE SCOPE,MED/LAT MENISECTOMY Left 2017    Procedure: KNEE DIAGNOSTIC ARTHROSCOPY;  ANTEROMEDIAL PLICA  RESECTION;  Surgeon: Michelet Alicia MD;  Location:  MAIN OR;  Service: Orthopedics    TUBAL LIGATION      WISDOM TOOTH EXTRACTION       Family History   Problem Relation Age of Onset    No Known Problems Mother     Heart disease Father     Hypertension Father     No Known Problems Sister     No Known Problems Maternal Aunt     Breast cancer Family        Objective   /83 (BP Location: Right arm, Patient Position: Sitting, Cuff Size: Large)   Pulse 73   Temp 97 8 °F (36 6 °C) (Tympanic)   Resp 20   Ht 5' 2" (1 575 m)   Wt 83 kg (183 lb)   SpO2 99%   BMI 33 47 kg/m²   No LMP recorded  Patient has had a hysterectomy  Physical Exam     Physical Exam   Constitutional: She is oriented to person, place, and time  She appears well-developed and well-nourished  No distress  Neurological: She is alert and oriented to person, place, and time  Skin: She is not diaphoretic  Laceration dorsal aspect left index finger over D IP joint  Approximately 1 3 cm in length  Clean  Flap  Psychiatric: She has a normal mood and affect  Nursing note and vitals reviewed          Laceration repair  Date/Time: 2020 7:19 PM  Performed by: Jamal Baker PA-C  Authorized by: Jamal Baker PA-C   Consent: Verbal consent obtained    Consent given by: patient  Patient understanding: patient states understanding of the procedure being performed  Patient identity confirmed: verbally with patient  Body area: upper extremity  Location details: left index finger  Laceration length: 1 3 cm  Foreign bodies: no foreign bodies  Tendon involvement: none  Nerve involvement: none  Vascular damage: no    Wound Dehiscence:  Superficial Wound Dehiscence: simple closure      Procedure Details:  Irrigation solution: saline  Skin closure: glue  Dressing: splint and pressure dressing  Patient tolerance: Patient tolerated the procedure well with no immediate complications

## 2020-01-30 NOTE — PATIENT INSTRUCTIONS
Finger glued  Splint to keep from bending and of finger for a couple days  May elevate and do cold compresses over top of finger if soreness  Recommend not getting finger wet for approximately 24 hours  Allow glued to dissolve on own  Do not pick off  Watch for signs of infection which would include increased redness, pain, swelling, nasty discharge  Follow-up as needed

## 2020-02-23 ENCOUNTER — APPOINTMENT (OUTPATIENT)
Dept: RADIOLOGY | Facility: MEDICAL CENTER | Age: 50
End: 2020-02-23
Attending: PHYSICIAN ASSISTANT
Payer: COMMERCIAL

## 2020-02-23 ENCOUNTER — OFFICE VISIT (OUTPATIENT)
Dept: URGENT CARE | Facility: MEDICAL CENTER | Age: 50
End: 2020-02-23
Payer: COMMERCIAL

## 2020-02-23 VITALS
HEART RATE: 73 BPM | TEMPERATURE: 98.1 F | OXYGEN SATURATION: 99 % | SYSTOLIC BLOOD PRESSURE: 137 MMHG | HEIGHT: 62 IN | DIASTOLIC BLOOD PRESSURE: 80 MMHG | RESPIRATION RATE: 16 BRPM | BODY MASS INDEX: 33.13 KG/M2 | WEIGHT: 180 LBS

## 2020-02-23 DIAGNOSIS — S99.921A TOE INJURY, RIGHT, INITIAL ENCOUNTER: Primary | ICD-10-CM

## 2020-02-23 DIAGNOSIS — S99.921A TOE INJURY, RIGHT, INITIAL ENCOUNTER: ICD-10-CM

## 2020-02-23 PROCEDURE — 99213 OFFICE O/P EST LOW 20 MIN: CPT | Performed by: PHYSICIAN ASSISTANT

## 2020-02-23 PROCEDURE — 73660 X-RAY EXAM OF TOE(S): CPT

## 2020-02-23 NOTE — PROGRESS NOTES
[facility]        NAME: Marybel Casey is a 44 y o  female  : 1970    MRN: [id number]  DATE: 2020  TIME: 6:24 PM    Assessment and Plan   Toe injury, right, initial encounter [O82 255O]  1  Toe injury, right, initial encounter  XR toe right second min 2 views    Post Op Shoe         Patient Instructions       Follow up with PCP in 3-5 days  Ice, buddy tape toes, cast shoe applied by nursing  Chief Complaint     Chief Complaint   Patient presents with    Toe Injury     Patient presents with pain, redness, and swelling in her right second toe  She reports that she tripped while walking up the stairs to her apartment today  History of Present Illness       Toe Pain    The incident occurred 6 to 12 hours ago  The incident occurred at home  The injury mechanism was a direct blow  The pain is present in the left toes  The quality of the pain is described as aching  The pain is moderate  The pain has been constant since onset  Pertinent negatives include no inability to bear weight, loss of motion, loss of sensation, muscle weakness, numbness or tingling  She reports no foreign bodies present  The symptoms are aggravated by movement, palpation and weight bearing  She has tried nothing for the symptoms  Review of Systems   Review of Systems   Neurological: Negative for tingling and numbness  All other systems reviewed and are negative          Current Medications       Current Outpatient Medications:     amitriptyline (ELAVIL) 25 mg tablet, Take 50 mg by mouth daily at bedtime  , Disp: , Rfl:     dicyclomine (BENTYL) 10 mg capsule, Take 20 mg by mouth as needed  , Disp: , Rfl:     metoprolol succinate (TOPROL-XL) 25 mg 24 hr tablet, Take 1 tablet (25 mg total) by mouth daily, Disp: 90 tablet, Rfl: 1    prazosin (MINIPRESS) 2 mg capsule, 2 caps daily, Disp: , Rfl:     SUMAtriptan Succinate (SUMAVEL DOSEPRO) 6 MG/0 5ML SOTJ, Inject 6 mg under the skin as needed  , Disp: , Rfl:     Current Allergies     Allergies as of 2020    (No Known Allergies)            The following portions of the patient's history were reviewed and updated as appropriate: allergies, current medications, past family history, past medical history, past social history, past surgical history and problem list      Past Medical History:   Diagnosis Date    Anxiety     B12 deficiency     Bipolar disorder (HCC)     Fibromyalgia     Fibromyalgia, primary     Hypertension     Iron deficiency anemia     Irritable bowel syndrome     [facility]     Migraines     Psychiatric disorder     PTSD (post-traumatic stress disorder)     Rheumatoid arthritis ([facility] )        Past Surgical History:   Procedure Laterality Date     SECTION      CHOLECYSTECTOMY      ENDOMETRIAL ABLATION      GASTRIC BYPASS      HYSTERECTOMY      [place] KNEE SCOPE,MED/LAT MENISECTOMY Left 2017    Procedure: KNEE DIAGNOSTIC ARTHROSCOPY;  ANTEROMEDIAL PLICA  RESECTION;  Surgeon: Michelet Alicia MD;  Location: [facility] OR;  Service: Orthopedics    TUBAL LIGATION      WISDOM TOOTH EXTRACTION         Family History   Problem Relation Age of Onset    No Known Problems Mother     Heart disease Father     Hypertension Father     No Known Problems Sister     No Known Problems Maternal Aunt     Breast cancer Family          Medications have been verified  Objective   /80   Pulse 73   Temp 98 1 °F (36 7 °C) (Tympanic)   Resp 16   Ht 5' 2" (1 575 m)   Wt 81 6 kg (180 lb)   SpO2 99%   BMI 32 92 kg/m²        Physical Exam     Physical Exam   Constitutional: She appears well-developed and well-nourished  Cardiovascular: Normal rate and regular rhythm  Pulmonary/Chest: Effort normal and breath sounds normal    Nursing note and vitals reviewed  Right 2nd toe tender on and the medial aspect of the D IP joint positive edema with erythema and ecchymosis  Neurovascularly intact    X-ray questionable fracture at the D IP joint medially positive arthritic changes

## 2020-02-27 ENCOUNTER — TELEPHONE (OUTPATIENT)
Dept: URGENT CARE | Facility: CLINIC | Age: 50
End: 2020-02-27

## 2020-02-27 ENCOUNTER — OFFICE VISIT (OUTPATIENT)
Dept: FAMILY MEDICINE CLINIC | Facility: CLINIC | Age: 50
End: 2020-02-27
Payer: COMMERCIAL

## 2020-02-27 VITALS
HEART RATE: 67 BPM | BODY MASS INDEX: 32.78 KG/M2 | HEIGHT: 63 IN | DIASTOLIC BLOOD PRESSURE: 88 MMHG | TEMPERATURE: 98.1 F | OXYGEN SATURATION: 98 % | SYSTOLIC BLOOD PRESSURE: 126 MMHG | WEIGHT: 185 LBS

## 2020-02-27 DIAGNOSIS — M06.9 RHEUMATOID ARTHRITIS, INVOLVING UNSPECIFIED SITE, UNSPECIFIED RHEUMATOID FACTOR PRESENCE: Chronic | ICD-10-CM

## 2020-02-27 DIAGNOSIS — R20.2 TINGLING: ICD-10-CM

## 2020-02-27 DIAGNOSIS — Z23 ENCOUNTER FOR IMMUNIZATION: ICD-10-CM

## 2020-02-27 DIAGNOSIS — Z12.31 BREAST CANCER SCREENING BY MAMMOGRAM: ICD-10-CM

## 2020-02-27 DIAGNOSIS — R26.89 BALANCE DISORDER: ICD-10-CM

## 2020-02-27 DIAGNOSIS — R79.0 LOW IRON STORES: ICD-10-CM

## 2020-02-27 DIAGNOSIS — Z00.00 MEDICARE ANNUAL WELLNESS VISIT, INITIAL: ICD-10-CM

## 2020-02-27 DIAGNOSIS — Z12.11 COLON CANCER SCREENING: ICD-10-CM

## 2020-02-27 DIAGNOSIS — S92.504A CLOSED NONDISPLACED FRACTURE OF PHALANX OF LESSER TOE OF RIGHT FOOT, UNSPECIFIED PHALANX, INITIAL ENCOUNTER: Primary | ICD-10-CM

## 2020-02-27 DIAGNOSIS — R42 DIZZINESS: ICD-10-CM

## 2020-02-27 DIAGNOSIS — G43.909 MIGRAINE WITHOUT STATUS MIGRAINOSUS, NOT INTRACTABLE, UNSPECIFIED MIGRAINE TYPE: Primary | ICD-10-CM

## 2020-02-27 DIAGNOSIS — F43.10 PTSD (POST-TRAUMATIC STRESS DISORDER): ICD-10-CM

## 2020-02-27 DIAGNOSIS — M79.7 FIBROMYALGIA: ICD-10-CM

## 2020-02-27 DIAGNOSIS — Q24.8 LEFT VENTRICULAR OUTFLOW OBSTRUCTION: ICD-10-CM

## 2020-02-27 PROBLEM — G25.81 RLS (RESTLESS LEGS SYNDROME): Status: ACTIVE | Noted: 2019-06-24

## 2020-02-27 PROBLEM — F31.9 BIPOLAR 1 DISORDER (HCC): Status: ACTIVE | Noted: 2018-12-24

## 2020-02-27 PROBLEM — K76.89 LIVER CYST: Status: ACTIVE | Noted: 2019-06-24

## 2020-02-27 PROCEDURE — 3074F SYST BP LT 130 MM HG: CPT | Performed by: FAMILY MEDICINE

## 2020-02-27 PROCEDURE — 1036F TOBACCO NON-USER: CPT | Performed by: FAMILY MEDICINE

## 2020-02-27 PROCEDURE — 3079F DIAST BP 80-89 MM HG: CPT | Performed by: FAMILY MEDICINE

## 2020-02-27 PROCEDURE — G0438 PPPS, INITIAL VISIT: HCPCS | Performed by: FAMILY MEDICINE

## 2020-02-27 PROCEDURE — 90682 RIV4 VACC RECOMBINANT DNA IM: CPT | Performed by: FAMILY MEDICINE

## 2020-02-27 PROCEDURE — G0008 ADMIN INFLUENZA VIRUS VAC: HCPCS | Performed by: FAMILY MEDICINE

## 2020-02-27 PROCEDURE — 99203 OFFICE O/P NEW LOW 30 MIN: CPT | Performed by: FAMILY MEDICINE

## 2020-02-27 NOTE — PROGRESS NOTES
Assessment and Plan:     Patient is seen for initial Medicare annual visit  Problem List Items Addressed This Visit     None      Visit Diagnoses     Encounter for immunization    -  Primary    Relevant Orders    influenza vaccine, [id number], quadrivalent, recombinant, PF, 0 5 mL, for patients 18 yr+ ([facility])        BMI Counseling: Body mass index is 32 37 kg/m²  The BMI is above normal  Nutrition recommendations include encouraging healthy choices of fruits and vegetables, moderation in carbohydrate intake, increasing intake of lean protein and reducing intake of saturated and trans fat  Exercise recommendations include moderate physical activity 150 minutes/week  No pharmacotherapy was ordered  Preventive health issues were discussed with patient, and age appropriate screening tests were ordered as noted in patient's After Visit Summary  Personalized health advice and appropriate referrals for health education or preventive services given if needed, as noted in patient's After Visit Summary  History of Present Illness:     Patient presents for Welcome to Medicare visit  Patient Care Team:  Eben Leggett DO as PCP - General (Family Medicine)  MD Aris Richard MD     Review of Systems:     Review of Systems   Constitutional: Negative for chills and fever  HENT: Negative for congestion and sore throat  Respiratory: Negative for chest tightness  Cardiovascular: Negative for chest pain and palpitations  Gastrointestinal: Negative for abdominal pain, constipation, diarrhea and nausea  Genitourinary: Negative for difficulty urinating  Skin: Negative  Neurological: Positive for dizziness, syncope and headaches  Psychiatric/Behavioral: Positive for decreased concentration and dysphoric mood        Problem List:     Patient Active Problem List   Diagnosis    Synovial plica of left knee    Low iron stores    Elevated alkaline phosphatase level    Syncope and collapse    History of gastric bypass    Essential hypertension    Rheumatoid arthritis (HCC)    Fibromyalgia    Class 2 obesity due to excess calories in adult    Headache    Left ventricular outflow obstruction      Past Medical and Surgical History:     Past Medical History:   Diagnosis Date    Anxiety     B12 deficiency     Bipolar disorder (HCC)     Fibromyalgia     Fibromyalgia, primary     Hypertension     Iron deficiency anemia     Irritable bowel syndrome     Lupus (HCC)     Migraines     Psychiatric disorder     PTSD (post-traumatic stress disorder)     Rheumatoid arthritis (HCC)      Past Surgical History:   Procedure Laterality Date     SECTION      CHOLECYSTECTOMY      ENDOMETRIAL ABLATION      GASTRIC BYPASS      HYSTERECTOMY      [place] KNEE SCOPE,MED/LAT MENISECTOMY Left 2017    Procedure: KNEE DIAGNOSTIC ARTHROSCOPY;  ANTEROMEDIAL PLICA  RESECTION;  Surgeon: Bev Worrell MD;  Location: [facility] OR;  Service: Orthopedics    TUBAL LIGATION      WISDOM TOOTH EXTRACTION        Family History:     Family History   Problem Relation Age of Onset    No Known Problems Mother     Heart disease Father     Hypertension Father     No Known Problems Sister     No Known Problems Maternal Aunt     Breast cancer Family       Social History:        Social History     Socioeconomic History    Marital status:      Spouse name: None    Number of children: None    Years of education: None    Highest education level: None   Occupational History    None   Social Needs    Financial resource strain: None    Food insecurity:     Worry: None     Inability: None    Transportation needs:     Medical: None     Non-medical: None   Tobacco Use    Smoking status: Never Smoker    Smokeless tobacco: Never Used   Substance and Sexual Activity    Alcohol use: No    Drug use: No    Sexual activity: None   Lifestyle    Physical activity:     Days per week: None     Minutes per session: None    Stress: None   Relationships    Social connections:     Talks on phone: None     Gets together: None     Attends Mandaeism service: None     Active member of club or organization: None     Attends meetings of clubs or organizations: None     Relationship status: None    Intimate partner violence:     Fear of current or ex partner: None     Emotionally abused: None     Physically abused: None     Forced sexual activity: None   Other Topics Concern    None   Social History Narrative    None      Medications and Allergies:     Current Outpatient Medications   Medication Sig Dispense Refill    amitriptyline (ELAVIL) 25 mg tablet Take 50 mg by mouth daily at bedtime        dicyclomine (BENTYL) 10 mg capsule Take 20 mg by mouth as needed        metoprolol succinate (TOPROL-XL) 25 mg 24 hr tablet Take 1 tablet (25 mg total) by mouth daily 90 tablet 1    prazosin (MINIPRESS) 2 mg capsule 2 caps daily      SUMAtriptan Succinate (SUMAVEL DOSEPRO) 6 MG/0 5ML SOTJ Inject 6 mg under the skin as needed         No current facility-administered medications for this visit  No Known Allergies   Immunizations:     Immunization History   Administered Date(s) Administered    INFLUENZA 12/24/2018    Influenza TIV (IM) 01/18/2013      Health Maintenance:         Topic Date Due    MAMMOGRAM  1970    CRC Screening: Colonoscopy  1970    Cervical Cancer Screening  02/01/1989         Topic Date Due    DTaP,Tdap,and Td Vaccines (1 - Tdap) 02/01/1979    Influenza Vaccine  07/01/2019      Medicare Screening Tests and Risk Assessments:     Rocio Waldrop is here for her Initial Wellness visit  Health Risk Assessment:   Patient rates overall health as fair  Patient feels that their physical health rating is same  Eyesight was rated as much worse  Hearing was rated as same  Patient feels that their emotional and mental health rating is slightly worse   Pain experienced in the last 7 days has been a lot  Patient's pain rating has been 7/10  Depression Screening:   PHQ-2 Score: 2      Fall Risk Screening: In the past year, patient has experienced: history of falling in past year    Number of falls: 2 or more  Injured during fall?: Yes    Feels unsteady when standing or walking?: Yes    Worried about falling?: No      Urinary Incontinence Screening:   Patient has not leaked urine accidently in the last six months  Home Safety:  Patient does not have trouble with stairs inside or outside of their home  Patient has working smoke alarms and has working carbon monoxide detector  Home safety hazards include: none  Nutrition:   Current diet is Low Carb and Limited junk food  Medications:   Patient is currently taking over-the-counter supplements  OTC medications include: see medication list  Patient is able to manage medications  Activities of Daily Living (ADLs)/Instrumental Activities of Daily Living (IADLs):   Walk and transfer into and out of bed and chair?: Yes  Dress and groom yourself?: Yes    Bathe or shower yourself?: Yes    Feed yourself?  Yes  Do your laundry/housekeeping?: Yes  Manage your money, pay your bills and track your expenses?: Yes  Make your own meals?: Yes    Do your own shopping?: Yes    Previous Hospitalizations:   Any hospitalizations or ED visits within the last 12 months?: No      Advance Care Planning:   Living will: No    Durable POA for healthcare: No    Advanced directive: No    Advanced directive counseling given: Yes      Cognitive Screening:   Provider or family/friend/caregiver concerned regarding cognition?: No    PREVENTIVE SCREENINGS      Cardiovascular Screening:    General: Screening Current      Diabetes Screening:     General: Screening Current      Colorectal Cancer Screening:     General: Risks and Benefits Discussed      Breast Cancer Screening:     General: Screening Current      Osteoporosis Screening:    General: Screening Not Indicated Abdominal Aortic Aneurysm (AAA) Screening:        General: Screening Not Indicated      Lung Cancer Screening:     General: Screening Not Indicated      Hepatitis C Screening:    General: Screening Not Indicated    No exam data present     Physical Exam:     /88 (BP Location: Left arm, Patient Position: Sitting, Cuff Size: Adult)   Pulse 67   Temp 98 1 °F (36 7 °C) (Tympanic)   Ht 5' 3 39" (1 61 m)   Wt 83 9 kg (185 lb)   SpO2 98%   BMI 32 37 kg/m²     Physical Exam   Constitutional: She is oriented to person, place, and time  No distress  Neck: Carotid bruit is not present  No thyromegaly present  Cardiovascular: Normal rate, regular rhythm and normal heart sounds  Pulmonary/Chest: Effort normal and breath sounds normal    Musculoskeletal: She exhibits no edema  Lymphadenopathy:     She has no cervical adenopathy  Neurological: She is alert and oriented to person, place, and time  Skin: Skin is warm and dry  Psychiatric: She has a normal mood and affect  Nursing note and vitals reviewed        Samira Topete DO

## 2020-02-27 NOTE — PATIENT INSTRUCTIONS
Medicare Preventive Visit Patient Instructions  Thank you for completing your Welcome to Medicare Visit or Medicare Annual Wellness Visit today  Your next wellness visit will be due in one year (2/27/2021)  The screening/preventive services that you may require over the next 5-10 years are detailed below  Some tests may not apply to you based off risk factors and/or age  Screening tests ordered at today's visit but not completed yet may show as past due  Also, please note that scanned in results may not display below  Preventive Screenings:  Service Recommendations Previous Testing/Comments   Colorectal Cancer Screening  * Colonoscopy    * Fecal Occult Blood Test (FOBT)/Fecal Immunochemical Test (FIT)  * Fecal DNA/Cologuard Test  * Flexible Sigmoidoscopy Age: 54-65 years old   Colonoscopy: every 10 years (may be performed more frequently if at higher risk)  OR  FOBT/FIT: every 1 year  OR  Cologuard: every 3 years  OR  Sigmoidoscopy: every 5 years  Screening may be recommended earlier than age 48 if at higher risk for colorectal cancer  Also, an individualized decision between you and your healthcare provider will decide whether screening between the ages of 74-80 would be appropriate  Colonoscopy: Not on file  FOBT/FIT: Not on file  Cologuard: Not on file  Sigmoidoscopy: Not on file         Breast Cancer Screening Age: 36 years old  Frequency: every 1-2 years  Not required if history of left and right mastectomy Mammogram: Not on file       Cervical Cancer Screening Between the ages of 21-29, pap smear recommended once every 3 years  Between the ages of 33-67, can perform pap smear with HPV co-testing every 5 years     Recommendations may differ for women with a history of total hysterectomy, cervical cancer, or abnormal pap smears in past  Pap Smear: Not on file       Hepatitis C Screening Once for adults born between [facility]  More frequently in patients at high risk for Hepatitis C Hep C Antibody: Not on file       Diabetes Screening 1-2 times per year if you're at risk for diabetes or have pre-diabetes Fasting glucose: 93 mg/dL   A1C: 5 4 %    Screening Current   Cholesterol Screening Once every 5 years if you don't have a lipid disorder  May order more often based on risk factors  Lipid panel: 05/24/2018    Screening Current     Other Preventive Screenings Covered by Medicare:  1  Abdominal Aortic Aneurysm (AAA) Screening: covered once if your at risk  You're considered to be at risk if you have a family history of AAA  2  Lung Cancer Screening: covers low dose CT scan once per year if you meet all of the following conditions: (1) Age 50-69; (2) No signs or symptoms of lung cancer; (3) Current smoker or have quit smoking within the last 15 years; (4) You have a tobacco smoking history of at least 30 pack years (packs per day multiplied by number of years you smoked); (5) You get a written order from a healthcare provider  3  Glaucoma Screening: covered annually if you're considered high risk: (1) You have diabetes OR (2) Family history of glaucoma OR (3)  aged 48 and older OR (3)  American aged 72 and older  3  Osteoporosis Screening: covered every 2 years if you meet one of the following conditions: (1) You're estrogen deficient and at risk for osteoporosis based off medical history and other findings; (2) Have a vertebral abnormality; (3) On glucocorticoid therapy for more than 3 months; (4) Have primary hyperparathyroidism; (5) On osteoporosis medications and need to assess response to drug therapy  · Last bone density test (DXA Scan): Not on file  5  HIV Screening: covered annually if you're between the age of 12-76  Also covered annually if you are younger than 13 and older than 72 with risk factors for HIV infection  For pregnant patients, it is covered up to 3 times per pregnancy      Immunizations:  Immunization Recommendations   Influenza Vaccine Annual influenza vaccination during flu season is recommended for all persons aged >= 6 months who do not have contraindications   Pneumococcal Vaccine (Prevnar and Pneumovax)  * Prevnar = PCV13  * Pneumovax = PPSV23   Adults 25-60 years old: 1-3 doses may be recommended based on certain risk factors  Adults 72 years old: Prevnar (PCV13) vaccine recommended followed by Pneumovax (PPSV23) vaccine  If already received PPSV23 since turning 65, then PCV13 recommended at least one year after PPSV23 dose  Hepatitis B Vaccine 3 dose series if at intermediate or high risk (ex: diabetes, end stage renal disease, liver disease)   Tetanus (Td) Vaccine - COST NOT COVERED BY MEDICARE PART B Following completion of primary series, a booster dose should be given every 10 years to maintain immunity against tetanus  Td may also be given as tetanus wound prophylaxis  Tdap Vaccine - COST NOT COVERED BY MEDICARE PART B Recommended at least once for all adults  For pregnant patients, recommended with each pregnancy  Shingles Vaccine (Shingrix) - COST NOT COVERED BY MEDICARE PART B  2 shot series recommended in those aged 48 and above     Health Maintenance Due:      Topic Date Due    MAMMOGRAM  1970    CRC Screening: Colonoscopy  1970    Cervical Cancer Screening  02/01/1989     Immunizations Due:      Topic Date Due    DTaP,Tdap,and Td Vaccines (1 - Tdap) 02/01/1979    Influenza Vaccine  07/01/2019     Advance Directives   What are advance directives? Advance directives are legal documents that state your wishes and plans for medical care  These plans are made ahead of time in case you lose your ability to make decisions for yourself  Advance directives can apply to any medical decision, such as the treatments you want, and if you want to donate organs  What are the types of advance directives? There are many types of advance directives, and each state has rules about how to use them   You may choose a combination of any of the following:  · Living will: This is a written record of the treatment you want  You can also choose which treatments you do not want, which to limit, and which to stop at a certain time  This includes surgery, medicine, IV fluid, and tube feedings  · Durable power of  for healthcare [facility]): This is a written record that states who you want to make healthcare choices for you when you are unable to make them for yourself  This person, called a proxy, is usually a family member or a friend  You may choose more than 1 proxy  · Do not resuscitate (DNR) order:  A DNR order is used in case your heart stops beating or you stop breathing  It is a request not to have certain forms of treatment, such as CPR  A DNR order may be included in other types of advance directives  · Medical directive: This covers the care that you want if you are in a coma, near death, or unable to make decisions for yourself  You can list the treatments you want for each condition  Treatment may include pain medicine, surgery, blood transfusions, dialysis, IV or tube feedings, and a ventilator (breathing machine)  · Values history: This document has questions about your views, beliefs, and how you feel and think about life  This information can help others choose the care that you would choose  Why are advance directives important? An advance directive helps you control your care  Although spoken wishes may be used, it is better to have your wishes written down  Spoken wishes can be misunderstood, or not followed  Treatments may be given even if you do not want them  An advance directive may make it easier for your family to make difficult choices about your care  Weight Management   Why it is important to manage your weight:  Being overweight increases your risk of health conditions such as heart disease, high blood pressure, type 2 diabetes, and certain types of cancer   It can also increase your risk for osteoarthritis, sleep apnea, and other respiratory problems  Aim for a slow, steady weight loss  Even a small amount of weight loss can lower your risk of health problems  How to lose weight safely:  A safe and healthy way to lose weight is to eat fewer calories and get regular exercise  You can lose up about 1 pound a week by decreasing the number of calories you eat by 500 calories each day  Healthy meal plan for weight management:  A healthy meal plan includes a variety of foods, contains fewer calories, and helps you stay healthy  A healthy meal plan includes the following:  · Eat whole-grain foods more often  A healthy meal plan should contain fiber  Fiber is the part of grains, fruits, and vegetables that is not broken down by your body  Whole-grain foods are healthy and provide extra fiber in your diet  Some examples of whole-grain foods are whole-wheat breads and pastas, oatmeal, brown rice, and bulgur  · Eat a variety of vegetables every day  Include dark, leafy greens such as spinach, kale, sheree greens, and mustard greens  Eat yellow and orange vegetables such as carrots, sweet potatoes, and winter squash  · Eat a variety of fruits every day  Choose fresh or canned fruit (canned in its own juice or light syrup) instead of juice  Fruit juice has very little or no fiber  · Eat low-fat dairy foods  Drink fat-free (skim) milk or 1% milk  Eat fat-free yogurt and low-fat cottage cheese  Try low-fat cheeses such as mozzarella and other reduced-fat cheeses  · Choose meat and other protein foods that are low in fat  Choose beans or other legumes such as split peas or lentils  Choose fish, skinless poultry (chicken or turkey), or lean cuts of red meat (beef or pork)  Before you cook meat or poultry, cut off any visible fat  · Use less fat and oil  Try baking foods instead of frying them  Add less fat, such as margarine, sour cream, regular salad dressing and mayonnaise to foods  Eat fewer high-fat foods   Some examples of high-fat foods include french fries, doughnuts, ice cream, and cakes  · Eat fewer sweets  Limit foods and drinks that are high in sugar  This includes candy, cookies, regular soda, and sweetened drinks  Exercise:  Exercise at least 30 minutes per day on most days of the week  Some examples of exercise include walking, biking, dancing, and swimming  You can also fit in more physical activity by taking the stairs instead of the elevator or parking farther away from stores  Ask your healthcare provider about the best exercise plan for you  © Copyright [employer] 2018 Information is for End User's use only and may not be sold, redistributed or otherwise used for commercial purposes   All illustrations and images included in CareNotes® are the copyrighted property of A D A M , Inc  or [address] [place], [place] [address]vicky rose mary

## 2020-02-27 NOTE — PROGRESS NOTES
Assessment/Plan:  Patient is going to apply for medical marijuana for PTSD  Was seen in Urgent Care  Diagnoses and all orders for this visit:    Migraine without status migrainosus, not intractable, unspecified migraine type  -     Ambulatory referral to Neurology; Future    Dizziness  -     Ambulatory referral to Neurology; Future    Balance disorder  -     Ambulatory referral to Neurology; Future    Encounter for immunization  -     influenza vaccine, 4747-6447, quadrivalent, recombinant, PF, 0 5 mL, for patients 18 yr+ (FLUBLOK)    Fibromyalgia    Rheumatoid arthritis, involving unspecified site, unspecified rheumatoid factor presence (HCC)    Low iron stores    Left ventricular outflow obstruction    Tingling    PTSD (post-traumatic stress disorder)  -     Ambulatory referral to Psychiatry; Future    Colon cancer screening  -     Ambulatory referral to Gastroenterology; Future    Breast cancer screening by mammogram  -     Mammo screening bilateral w 3d & cad; Future          Subjective:   Chief Complaint   Patient presents with   [address] Medicare Wellness Visit        Patient ID: Goyo Dixon is a 44 y o  female  Patient is here to re-establish  She has rheumatoid arthritis  Fibromyalgia -off Plaquenil because of eyes  Had syncopal episode -   Complains of headaches, dizziness, also tripped and fell three times in the past week  Also possibly another syncopal episode  On prazososin for nightmares (used for PTSD)      The following portions of the patient's history were reviewed and updated as appropriate: allergies, current medications, past family history, past medical history, past social history, past surgical history and problem list     Review of Systems   Constitutional: Negative for chills and fever  HENT: Negative for congestion and sore throat  Respiratory: Negative for chest tightness  Cardiovascular: Negative for chest pain and palpitations     Gastrointestinal: Negative for abdominal pain, constipation, diarrhea and nausea  Genitourinary: Negative for difficulty urinating  Skin: Negative  Neurological: Positive for dizziness, syncope and headaches  Psychiatric/Behavioral: Positive for decreased concentration and dysphoric mood  The patient is nervous/anxious  Objective:      /88 (BP Location: Left arm, Patient Position: Sitting, Cuff Size: Adult)   Pulse 67   Temp 98 1 °F (36 7 °C) (Tympanic)   Ht 5' 3 39" (1 61 m)   Wt 83 9 kg (185 lb)   SpO2 98%   BMI 32 37 kg/m²          Physical Exam   Constitutional: She is oriented to person, place, and time  She appears well-developed  No distress  Neck: Carotid bruit is not present  No thyromegaly present  Cardiovascular: Normal rate, regular rhythm and normal heart sounds  Pulmonary/Chest: Effort normal and breath sounds normal    Musculoskeletal: She exhibits no edema  Lymphadenopathy:     She has no cervical adenopathy  Neurological: She is alert and oriented to person, place, and time  Skin: Skin is warm and dry  Psychiatric: She has a normal mood and affect  Nursing note and vitals reviewed

## 2020-02-27 NOTE — LETTER
February 27, 2020     Patient: Parul Moore   YOB: 1970   Date of Visit: 2/27/2020       To Whom it May Concern:    Georgia Khan is under my professional care  She was seen in my office on 2/27/2020  She is treated for PTSD, anxiety, fibromyalgia, and irritable bowel syndrome  If you have any questions or concerns, please don't hesitate to call           Sincerely,          Samira Topete DO        CC: No Recipients

## 2020-02-28 ENCOUNTER — TELEPHONE (OUTPATIENT)
Dept: ADMINISTRATIVE | Facility: OTHER | Age: 50
End: 2020-02-28

## 2020-02-28 NOTE — TELEPHONE ENCOUNTER
----- Message from Kiya Schmidt sent at 2/27/2020  4:06 PM EST -----  02/27/20 4:06 PM    Hello, our patient Argenis Briceno has had Mammogram completed/performed  Please assist in updating the patient chart by pulling the Care Everywhere (CE) document  The date of service is 05/21/19       Thank you,  Paul Magdaleno MA  [facility]

## 2020-02-28 NOTE — TELEPHONE ENCOUNTER
Upon review of the In Basket request we were able to locate, review, and update the patient chart as requested for Mammogram     Any additional questions or concerns should be emailed to the Practice Liaisons via [email or web address] email, please do not reply via In Basket      Thank you  Ban Awad

## 2020-02-28 NOTE — TELEPHONE ENCOUNTER
X rays were interpreted as a possible fracture of the toe  Does not appear that provider notified of fracture  Was placed already into a hard sole shoe    Recommend follow-up with Orthopedics for further treatment

## 2020-05-01 ENCOUNTER — HOSPITAL ENCOUNTER (OUTPATIENT)
Dept: NON INVASIVE DIAGNOSTICS | Facility: HOSPITAL | Age: 50
Discharge: HOME | End: 2020-05-01
Attending: INTERNAL MEDICINE
Payer: COMMERCIAL

## 2020-05-01 DIAGNOSIS — Q24.8 LEFT VENTRICULAR OUTFLOW OBSTRUCTION: ICD-10-CM

## 2020-05-01 DIAGNOSIS — R00.2 PALPITATIONS: ICD-10-CM

## 2020-05-01 PROCEDURE — 93306 TTE W/DOPPLER COMPLETE: CPT

## 2020-05-01 PROCEDURE — 93306 TTE W/DOPPLER COMPLETE: CPT | Performed by: INTERNAL MEDICINE

## 2020-05-20 ENCOUNTER — TELEPHONE (OUTPATIENT)
Dept: NEUROLOGY | Facility: CLINIC | Age: 50
End: 2020-05-20

## 2020-05-20 ENCOUNTER — TELEMEDICINE (OUTPATIENT)
Dept: FAMILY MEDICINE CLINIC | Facility: CLINIC | Age: 50
End: 2020-05-20
Payer: COMMERCIAL

## 2020-05-20 DIAGNOSIS — R10.12 LEFT UPPER QUADRANT ABDOMINAL PAIN: Primary | ICD-10-CM

## 2020-05-20 DIAGNOSIS — R19.8 CHANGE IN BOWEL MOVEMENT: ICD-10-CM

## 2020-05-20 DIAGNOSIS — Z20.828 EXPOSURE TO SARS-ASSOCIATED CORONAVIRUS: ICD-10-CM

## 2020-05-20 PROCEDURE — 99214 OFFICE O/P EST MOD 30 MIN: CPT | Performed by: FAMILY MEDICINE

## 2020-05-22 ENCOUNTER — TELEPHONE (OUTPATIENT)
Dept: NEUROLOGY | Facility: CLINIC | Age: 50
End: 2020-05-22

## 2020-05-22 DIAGNOSIS — Z20.828 EXPOSURE TO SARS-ASSOCIATED CORONAVIRUS: ICD-10-CM

## 2020-05-22 PROCEDURE — U0003 INFECTIOUS AGENT DETECTION BY NUCLEIC ACID (DNA OR RNA); SEVERE ACUTE RESPIRATORY SYNDROME CORONAVIRUS 2 (SARS-COV-2) (CORONAVIRUS DISEASE [COVID-19]), AMPLIFIED PROBE TECHNIQUE, MAKING USE OF HIGH THROUGHPUT TECHNOLOGIES AS DESCRIBED BY CMS-2020-01-R: HCPCS

## 2020-05-24 LAB — SARS-COV-2 RNA SPEC QL NAA+PROBE: NOT DETECTED

## 2020-05-27 ENCOUNTER — HOSPITAL ENCOUNTER (OUTPATIENT)
Dept: MAMMOGRAPHY | Facility: MEDICAL CENTER | Age: 50
Discharge: HOME | End: 2020-05-27
Payer: COMMERCIAL

## 2020-05-27 VITALS — BODY MASS INDEX: 32.78 KG/M2 | HEIGHT: 63 IN | WEIGHT: 185 LBS

## 2020-05-27 DIAGNOSIS — Z12.31 BREAST CANCER SCREENING BY MAMMOGRAM: ICD-10-CM

## 2020-05-27 PROCEDURE — 77063 BREAST TOMOSYNTHESIS BI: CPT

## 2020-05-27 PROCEDURE — 77067 SCR MAMMO BI INCL CAD: CPT

## 2020-06-25 DIAGNOSIS — Q24.8 LEFT VENTRICULAR OUTFLOW OBSTRUCTION: ICD-10-CM

## 2020-06-25 DIAGNOSIS — F31.9 BIPOLAR 1 DISORDER (HCC): Primary | ICD-10-CM

## 2020-06-25 DIAGNOSIS — M79.7 FIBROMYALGIA: ICD-10-CM

## 2020-06-26 RX ORDER — METOPROLOL SUCCINATE 25 MG/1
25 TABLET, EXTENDED RELEASE ORAL DAILY
Qty: 90 TABLET | Refills: 1 | Status: SHIPPED | OUTPATIENT
Start: 2020-06-26 | End: 2020-09-29 | Stop reason: SDUPTHER

## 2020-06-26 RX ORDER — PRAZOSIN HYDROCHLORIDE 2 MG/1
2 CAPSULE ORAL
Qty: 90 CAPSULE | Refills: 1 | Status: SHIPPED | OUTPATIENT
Start: 2020-06-26 | End: 2020-06-30 | Stop reason: SDUPTHER

## 2020-06-26 RX ORDER — AMITRIPTYLINE HYDROCHLORIDE 25 MG/1
50 TABLET, FILM COATED ORAL
Qty: 90 TABLET | Refills: 1 | Status: SHIPPED | OUTPATIENT
Start: 2020-06-26 | End: 2020-06-30 | Stop reason: SDUPTHER

## 2020-06-30 ENCOUNTER — TELEPHONE (OUTPATIENT)
Dept: FAMILY MEDICINE CLINIC | Facility: CLINIC | Age: 50
End: 2020-06-30

## 2020-06-30 DIAGNOSIS — M79.7 FIBROMYALGIA: ICD-10-CM

## 2020-06-30 DIAGNOSIS — F31.9 BIPOLAR 1 DISORDER (HCC): ICD-10-CM

## 2020-06-30 RX ORDER — AMITRIPTYLINE HYDROCHLORIDE 50 MG/1
50 TABLET, FILM COATED ORAL
Qty: 90 TABLET | Refills: 1 | Status: SHIPPED | OUTPATIENT
Start: 2020-06-30 | End: 2020-10-20

## 2020-06-30 RX ORDER — PRAZOSIN HYDROCHLORIDE 2 MG/1
4 CAPSULE ORAL
Qty: 180 CAPSULE | Refills: 1 | Status: SHIPPED | OUTPATIENT
Start: 2020-06-30 | End: 2020-10-20

## 2020-07-10 ENCOUNTER — TELEPHONE (OUTPATIENT)
Dept: FAMILY MEDICINE CLINIC | Facility: CLINIC | Age: 50
End: 2020-07-10

## 2020-07-10 NOTE — TELEPHONE ENCOUNTER
LMOM asking pt to call office and verify if behavior health order is still needed, if so provide info for scheduling; if outside of [facility] or not needed, update order and remove

## 2020-07-23 ENCOUNTER — OFFICE VISIT (OUTPATIENT)
Dept: FAMILY MEDICINE CLINIC | Facility: CLINIC | Age: 50
End: 2020-07-23
Payer: COMMERCIAL

## 2020-07-23 VITALS
HEIGHT: 63 IN | BODY MASS INDEX: 32.32 KG/M2 | HEART RATE: 68 BPM | TEMPERATURE: 98.2 F | WEIGHT: 182.4 LBS | DIASTOLIC BLOOD PRESSURE: 82 MMHG | OXYGEN SATURATION: 97 % | SYSTOLIC BLOOD PRESSURE: 118 MMHG

## 2020-07-23 DIAGNOSIS — R39.15 URGENCY OF URINATION: ICD-10-CM

## 2020-07-23 DIAGNOSIS — R30.0 BURNING WITH URINATION: Primary | ICD-10-CM

## 2020-07-23 DIAGNOSIS — F31.9 BIPOLAR 1 DISORDER (HCC): ICD-10-CM

## 2020-07-23 DIAGNOSIS — R35.0 URINARY FREQUENCY: ICD-10-CM

## 2020-07-23 LAB
SL AMB  POCT GLUCOSE, UA: ABNORMAL
SL AMB LEUKOCYTE ESTERASE,UA: ABNORMAL
SL AMB POCT BILIRUBIN,UA: ABNORMAL
SL AMB POCT BLOOD,UA: ABNORMAL
SL AMB POCT CLARITY,UA: CLEAR
SL AMB POCT COLOR,UA: ABNORMAL
SL AMB POCT KETONES,UA: ABNORMAL
SL AMB POCT NITRITE,UA: ABNORMAL
SL AMB POCT PH,UA: 7
SL AMB POCT SPECIFIC GRAVITY,UA: 1.02
SL AMB POCT URINE PROTEIN: ABNORMAL
SL AMB POCT UROBILINOGEN: 2

## 2020-07-23 PROCEDURE — 87077 CULTURE AEROBIC IDENTIFY: CPT | Performed by: FAMILY MEDICINE

## 2020-07-23 PROCEDURE — 99213 OFFICE O/P EST LOW 20 MIN: CPT | Performed by: FAMILY MEDICINE

## 2020-07-23 PROCEDURE — 3079F DIAST BP 80-89 MM HG: CPT | Performed by: FAMILY MEDICINE

## 2020-07-23 PROCEDURE — 87086 URINE CULTURE/COLONY COUNT: CPT | Performed by: FAMILY MEDICINE

## 2020-07-23 PROCEDURE — 87186 SC STD MICRODIL/AGAR DIL: CPT | Performed by: FAMILY MEDICINE

## 2020-07-23 PROCEDURE — 3008F BODY MASS INDEX DOCD: CPT | Performed by: FAMILY MEDICINE

## 2020-07-23 PROCEDURE — 3074F SYST BP LT 130 MM HG: CPT | Performed by: FAMILY MEDICINE

## 2020-07-23 PROCEDURE — 1036F TOBACCO NON-USER: CPT | Performed by: FAMILY MEDICINE

## 2020-07-23 PROCEDURE — 81002 URINALYSIS NONAUTO W/O SCOPE: CPT | Performed by: FAMILY MEDICINE

## 2020-07-23 RX ORDER — PHENAZOPYRIDINE HYDROCHLORIDE 200 MG/1
200 TABLET, FILM COATED ORAL 3 TIMES DAILY PRN
Qty: 10 TABLET | Refills: 0 | Status: SHIPPED | OUTPATIENT
Start: 2020-07-23 | End: 2020-10-01 | Stop reason: ALTCHOICE

## 2020-07-23 RX ORDER — SULFAMETHOXAZOLE AND TRIMETHOPRIM 800; 160 MG/1; MG/1
1 TABLET ORAL EVERY 12 HOURS SCHEDULED
Qty: 14 TABLET | Refills: 0 | Status: SHIPPED | OUTPATIENT
Start: 2020-07-23 | End: 2020-07-25 | Stop reason: ALTCHOICE

## 2020-07-23 RX ORDER — OMEPRAZOLE 20 MG/1
20 CAPSULE, DELAYED RELEASE ORAL DAILY
COMMUNITY
Start: 2019-06-24 | End: 2020-10-13

## 2020-07-23 NOTE — PROGRESS NOTES
Assessment/Plan:    Patient is a 38-year-old female with urinary symptoms  I am going to treat her as if she has a bladder infection  I have prescribed antibiotic and generic for pyridium  If her symptoms persist and her culture is negative then we will need to consider problems with the musculature of the bladder  Diagnoses and all orders for this visit:    Burning with urination  -     POCT urine dip  -     Urine culture; Future  -     Urine culture  -     Discontinue: sulfamethoxazole-trimethoprim (BACTRIM DS) 800-160 mg per tablet; Take 1 tablet by mouth every 12 (twelve) hours for 7 days  -     phenazopyridine (PYRIDIUM) 200 mg tablet; Take 1 tablet (200 mg total) by mouth 3 (three) times a day as needed for bladder spasms    Urinary frequency  -     POCT urine dip  -     Urine culture; Future  -     Urine culture    Urgency of urination  -     POCT urine dip  -     Urine culture; Future  -     Urine culture    Bipolar 1 disorder (HCC)    Other orders  -     omeprazole (PriLOSEC) 20 mg delayed release capsule; Take 20 mg by mouth daily          Subjective:   Chief Complaint   Patient presents with    Bladder Problem     Urge and burning during urination  Going on for about a week  Patient ID: Tonya Gregory is a 44 y o  female  Difficulty Urinating    This is a new problem  The current episode started 1 to 4 weeks ago  The problem occurs intermittently  The problem has been gradually worsening  The quality of the pain is described as burning  The pain is at a severity of 4/10  There has been no fever  She is not sexually active  There is a history of pyelonephritis  Associated symptoms include frequency and urgency  Pertinent negatives include no chills         The following portions of the patient's history were reviewed and updated as appropriate: allergies, current medications, past family history, past medical history, past social history, past surgical history and problem list     Review of Systems   Constitutional: Negative for chills and fever  Genitourinary: Positive for dysuria, frequency and urgency  Foul smelling   Musculoskeletal: Negative for back pain  Objective:      /82 (BP Location: Left arm, Patient Position: Sitting, Cuff Size: Large)   Pulse 68   Temp 98 2 °F (36 8 °C) (Tympanic)   Ht 5' 3" (1 6 m)   Wt 82 7 kg (182 lb 6 4 oz)   SpO2 97%   BMI 32 31 kg/m²          Physical Exam   Constitutional: She is oriented to person, place, and time  She appears well-developed  No distress  Neck: No thyromegaly present  Cardiovascular: Normal rate, regular rhythm and normal heart sounds  Pulmonary/Chest: Effort normal and breath sounds normal    Abdominal: Soft  There is tenderness (suprapubic)  Musculoskeletal: She exhibits no edema  Lymphadenopathy:     She has no cervical adenopathy  Neurological: She is alert and oriented to person, place, and time  Skin: Skin is warm and dry  Psychiatric: She has a normal mood and affect  Nursing note and vitals reviewed

## 2020-07-25 DIAGNOSIS — N30.00 ACUTE CYSTITIS WITHOUT HEMATURIA: Primary | ICD-10-CM

## 2020-07-25 LAB — BACTERIA UR CULT: ABNORMAL

## 2020-07-25 RX ORDER — CIPROFLOXACIN 500 MG/1
500 TABLET, FILM COATED ORAL EVERY 12 HOURS SCHEDULED
Qty: 10 TABLET | Refills: 0 | Status: SHIPPED | OUTPATIENT
Start: 2020-07-25 | End: 2020-07-30

## 2020-08-19 ENCOUNTER — HOSPITAL ENCOUNTER (EMERGENCY)
Facility: HOSPITAL | Age: 50
Discharge: HOME | End: 2020-08-19
Attending: EMERGENCY MEDICINE | Admitting: EMERGENCY MEDICINE
Payer: COMMERCIAL

## 2020-08-19 ENCOUNTER — APPOINTMENT (EMERGENCY)
Dept: RADIOLOGY | Facility: HOSPITAL | Age: 50
End: 2020-08-19
Payer: COMMERCIAL

## 2020-08-19 VITALS
WEIGHT: 174 LBS | BODY MASS INDEX: 30.82 KG/M2 | TEMPERATURE: 98.9 F | OXYGEN SATURATION: 100 % | HEART RATE: 73 BPM | DIASTOLIC BLOOD PRESSURE: 83 MMHG | SYSTOLIC BLOOD PRESSURE: 143 MMHG | RESPIRATION RATE: 18 BRPM

## 2020-08-19 DIAGNOSIS — R07.89 CHEST WALL PAIN: ICD-10-CM

## 2020-08-19 DIAGNOSIS — K29.00 ACUTE GASTRITIS WITHOUT HEMORRHAGE, UNSPECIFIED GASTRITIS TYPE: Primary | ICD-10-CM

## 2020-08-19 LAB
ALBUMIN SERPL BCP-MCNC: 4.1 G/DL (ref 3.5–5)
ALP SERPL-CCNC: 147 U/L (ref 46–116)
ALT SERPL W P-5'-P-CCNC: 27 U/L (ref 12–78)
ANION GAP SERPL CALCULATED.3IONS-SCNC: 7 MMOL/L (ref 4–13)
AST SERPL W P-5'-P-CCNC: 16 U/L (ref 5–45)
ATRIAL RATE: 79 BPM
BACTERIA UR QL AUTO: ABNORMAL /HPF
BASOPHILS # BLD AUTO: 0.03 THOUSANDS/ΜL (ref 0–0.1)
BASOPHILS NFR BLD AUTO: 1 % (ref 0–1)
BILIRUB SERPL-MCNC: 1.03 MG/DL (ref 0.2–1)
BILIRUB UR QL STRIP: ABNORMAL
BUN SERPL-MCNC: 9 MG/DL (ref 5–25)
CALCIUM SERPL-MCNC: 9 MG/DL (ref 8.3–10.1)
CHLORIDE SERPL-SCNC: 108 MMOL/L (ref 100–108)
CLARITY UR: CLEAR
CO2 SERPL-SCNC: 25 MMOL/L (ref 21–32)
COLOR UR: ABNORMAL
CREAT SERPL-MCNC: 1.04 MG/DL (ref 0.6–1.3)
EOSINOPHIL # BLD AUTO: 0.03 THOUSAND/ΜL (ref 0–0.61)
EOSINOPHIL NFR BLD AUTO: 1 % (ref 0–6)
ERYTHROCYTE [DISTWIDTH] IN BLOOD BY AUTOMATED COUNT: 13.2 % (ref 11.6–15.1)
GFR SERPL CREATININE-BSD FRML MDRD: 62 ML/MIN/1.73SQ M
GLUCOSE SERPL-MCNC: 85 MG/DL (ref 65–140)
GLUCOSE UR STRIP-MCNC: NEGATIVE MG/DL
HCT VFR BLD AUTO: 45.8 % (ref 34.8–46.1)
HGB BLD-MCNC: 15.3 G/DL (ref 11.5–15.4)
HGB UR QL STRIP.AUTO: NEGATIVE
HYALINE CASTS #/AREA URNS LPF: ABNORMAL /LPF
IMM GRANULOCYTES # BLD AUTO: 0.02 THOUSAND/UL (ref 0–0.2)
IMM GRANULOCYTES NFR BLD AUTO: 0 % (ref 0–2)
KETONES UR STRIP-MCNC: ABNORMAL MG/DL
LEUKOCYTE ESTERASE UR QL STRIP: ABNORMAL
LYMPHOCYTES # BLD AUTO: 1.12 THOUSANDS/ΜL (ref 0.6–4.47)
LYMPHOCYTES NFR BLD AUTO: 17 % (ref 14–44)
MCH RBC QN AUTO: 31.4 PG (ref 26.8–34.3)
MCHC RBC AUTO-ENTMCNC: 33.4 G/DL (ref 31.4–37.4)
MCV RBC AUTO: 94 FL (ref 82–98)
MONOCYTES # BLD AUTO: 0.45 THOUSAND/ΜL (ref 0.17–1.22)
MONOCYTES NFR BLD AUTO: 7 % (ref 4–12)
NEUTROPHILS # BLD AUTO: 4.8 THOUSANDS/ΜL (ref 1.85–7.62)
NEUTS SEG NFR BLD AUTO: 74 % (ref 43–75)
NITRITE UR QL STRIP: NEGATIVE
NON-SQ EPI CELLS URNS QL MICRO: ABNORMAL /HPF
NRBC BLD AUTO-RTO: 0 /100 WBCS
P AXIS: 68 DEGREES
PH UR STRIP.AUTO: 6 [PH]
PLATELET # BLD AUTO: 281 THOUSANDS/UL (ref 149–390)
PMV BLD AUTO: 10.7 FL (ref 8.9–12.7)
POTASSIUM SERPL-SCNC: 3.9 MMOL/L (ref 3.5–5.3)
PR INTERVAL: 126 MS
PROT SERPL-MCNC: 7.6 G/DL (ref 6.4–8.2)
PROT UR STRIP-MCNC: ABNORMAL MG/DL
QRS AXIS: 44 DEGREES
QRSD INTERVAL: 68 MS
QT INTERVAL: 368 MS
QTC INTERVAL: 421 MS
RBC # BLD AUTO: 4.88 MILLION/UL (ref 3.81–5.12)
RBC #/AREA URNS AUTO: ABNORMAL /HPF
SODIUM SERPL-SCNC: 140 MMOL/L (ref 136–145)
SP GR UR STRIP.AUTO: 1.03 (ref 1–1.03)
T WAVE AXIS: 47 DEGREES
TROPONIN I SERPL-MCNC: <0.02 NG/ML
UROBILINOGEN UR QL STRIP.AUTO: 1 E.U./DL
VENTRICULAR RATE: 79 BPM
WBC # BLD AUTO: 6.45 THOUSAND/UL (ref 4.31–10.16)
WBC #/AREA URNS AUTO: ABNORMAL /HPF

## 2020-08-19 PROCEDURE — 85025 COMPLETE CBC W/AUTO DIFF WBC: CPT | Performed by: EMERGENCY MEDICINE

## 2020-08-19 PROCEDURE — 71046 X-RAY EXAM CHEST 2 VIEWS: CPT

## 2020-08-19 PROCEDURE — 99285 EMERGENCY DEPT VISIT HI MDM: CPT

## 2020-08-19 PROCEDURE — 84484 ASSAY OF TROPONIN QUANT: CPT | Performed by: EMERGENCY MEDICINE

## 2020-08-19 PROCEDURE — 96375 TX/PRO/DX INJ NEW DRUG ADDON: CPT

## 2020-08-19 PROCEDURE — 80053 COMPREHEN METABOLIC PANEL: CPT | Performed by: EMERGENCY MEDICINE

## 2020-08-19 PROCEDURE — 93005 ELECTROCARDIOGRAM TRACING: CPT

## 2020-08-19 PROCEDURE — 36415 COLL VENOUS BLD VENIPUNCTURE: CPT | Performed by: EMERGENCY MEDICINE

## 2020-08-19 PROCEDURE — 99284 EMERGENCY DEPT VISIT MOD MDM: CPT | Performed by: EMERGENCY MEDICINE

## 2020-08-19 PROCEDURE — 81001 URINALYSIS AUTO W/SCOPE: CPT | Performed by: EMERGENCY MEDICINE

## 2020-08-19 PROCEDURE — 96374 THER/PROPH/DIAG INJ IV PUSH: CPT

## 2020-08-19 PROCEDURE — 93010 ELECTROCARDIOGRAM REPORT: CPT | Performed by: INTERNAL MEDICINE

## 2020-08-19 RX ORDER — ONDANSETRON 2 MG/ML
4 INJECTION INTRAMUSCULAR; INTRAVENOUS ONCE
Status: COMPLETED | OUTPATIENT
Start: 2020-08-19 | End: 2020-08-19

## 2020-08-19 RX ORDER — ALUMINA, MAGNESIA, AND SIMETHICONE 2400; 2400; 240 MG/30ML; MG/30ML; MG/30ML
5 SUSPENSION ORAL EVERY 6 HOURS PRN
Qty: 355 ML | Refills: 0 | Status: SHIPPED | OUTPATIENT
Start: 2020-08-19 | End: 2020-08-19 | Stop reason: CLARIF

## 2020-08-19 RX ORDER — ONDANSETRON 4 MG/1
4 TABLET, FILM COATED ORAL EVERY 6 HOURS
Qty: 12 TABLET | Refills: 0 | Status: SHIPPED | OUTPATIENT
Start: 2020-08-19 | End: 2020-08-19 | Stop reason: CLARIF

## 2020-08-19 RX ORDER — FAMOTIDINE 20 MG/1
20 TABLET, FILM COATED ORAL 2 TIMES DAILY
Qty: 30 TABLET | Refills: 0 | Status: SHIPPED | OUTPATIENT
Start: 2020-08-19 | End: 2020-10-13

## 2020-08-19 RX ORDER — SUCRALFATE ORAL 1 G/10ML
1 SUSPENSION ORAL 4 TIMES DAILY
Qty: 420 ML | Refills: 0 | Status: SHIPPED | OUTPATIENT
Start: 2020-08-19 | End: 2020-08-19 | Stop reason: CLARIF

## 2020-08-19 RX ORDER — MAGNESIUM HYDROXIDE/ALUMINUM HYDROXICE/SIMETHICONE 120; 1200; 1200 MG/30ML; MG/30ML; MG/30ML
30 SUSPENSION ORAL ONCE
Status: COMPLETED | OUTPATIENT
Start: 2020-08-19 | End: 2020-08-19

## 2020-08-19 RX ORDER — KETOROLAC TROMETHAMINE 30 MG/ML
15 INJECTION, SOLUTION INTRAMUSCULAR; INTRAVENOUS ONCE
Status: COMPLETED | OUTPATIENT
Start: 2020-08-19 | End: 2020-08-19

## 2020-08-19 RX ORDER — SUCRALFATE ORAL 1 G/10ML
1000 SUSPENSION ORAL ONCE
Status: COMPLETED | OUTPATIENT
Start: 2020-08-19 | End: 2020-08-19

## 2020-08-19 RX ADMIN — KETOROLAC TROMETHAMINE 15 MG: 30 INJECTION, SOLUTION INTRAMUSCULAR at 17:46

## 2020-08-19 RX ADMIN — ONDANSETRON 4 MG: 2 INJECTION INTRAMUSCULAR; INTRAVENOUS at 17:43

## 2020-08-19 RX ADMIN — ALUMINUM HYDROXIDE, MAGNESIUM HYDROXIDE, AND SIMETHICONE 30 ML: 200; 200; 20 SUSPENSION ORAL at 17:48

## 2020-08-19 RX ADMIN — SUCRALFATE 1000 MG: 1 SUSPENSION ORAL at 17:48

## 2020-08-19 NOTE — ED PROVIDER NOTES
History  Chief Complaint   Patient presents with    Abdominal Pain     pt reports "not feeling well for about a week, N/V, headache, RUQ pain and chest pain, it hurts to breathe and it feels like there is somethign on my chest  I also had very black stool this morning     Chest Pain     HPI  Patient is 50F presenting with Abdominal and chest pain  Hx of gastric bypass, anxiety and fibromyalgia  She states that the symptom started last night where she felt epigastric pain, left chest pain and left and right flank pain  The pain is pleuritic in nature and at baseline she has pressure like pain that is spiked with stabbing pain upon deep inspiration  She also started to feel nauseas and had several episodes of vomiting  Diffuse headache also started yesterday when the chest pain started  Additional complaint she had was black stool this morning  Patient denies fever, chills, diaphoresis, visual deficit, hearing changes, weakness, numbness, tingling  Prior to Admission Medications   Prescriptions Last Dose Informant Patient Reported? Taking?    SUMAtriptan Succinate ([place]) 6 MG/0 5ML SOTJ 8/18/2020 at Unknown time Self Yes Yes   Sig: Inject 6 mg under the skin as needed     amitriptyline (ELAVIL) 50 mg tablet 8/18/2020 at Unknown time  No Yes   Sig: Take 1 tablet (50 mg total) by mouth daily at bedtime   dicyclomine (BENTYL) 10 mg capsule Not Taking at Unknown time Self Yes No   Sig: Take 20 mg by mouth as needed     metoprolol succinate (TOPROL-XL) 25 mg 24 hr tablet 8/18/2020 at Unknown time  No Yes   Sig: Take 1 tablet (25 mg total) by mouth daily   omeprazole (PriLOSEC) 20 mg delayed release capsule   Yes No   Sig: Take 20 mg by mouth daily   phenazopyridine (PYRIDIUM) 200 mg tablet Not Taking at Unknown time  No No   Sig: Take 1 tablet (200 mg total) by mouth 3 (three) times a day as needed for bladder spasms   Patient not taking: Reported on 8/19/2020   prazosin (MINIPRESS) 2 mg capsule Not Taking at Unknown time  No No   Sig: Take 2 capsules (4 mg total) by mouth daily at bedtime   Patient not taking: Reported on 2020      Facility-Administered Medications: None       Past Medical History:   Diagnosis Date    Anxiety     B12 deficiency     Bipolar disorder (HCC)     Fibromyalgia     Fibromyalgia, primary     Hypertension     Iron deficiency anemia     Irritable bowel syndrome     Lupus (HCC)     Migraines     Psychiatric disorder     PTSD (post-traumatic stress disorder)     Rheumatoid arthritis (HCC)        Past Surgical History:   Procedure Laterality Date     SECTION      CHOLECYSTECTOMY      ENDOMETRIAL ABLATION      GASTRIC BYPASS      HYSTERECTOMY      MI KNEE SCOPE,MED/LAT MENISECTOMY Left 2017    Procedure: KNEE DIAGNOSTIC ARTHROSCOPY;  ANTEROMEDIAL PLICA  RESECTION;  Surgeon: Vahid Moody MD;  Location: [facility] OR;  Service: Orthopedics    TUBAL LIGATION      WISDOM TOOTH EXTRACTION         Family History   Problem Relation Age of Onset    No Known Problems Mother     Heart disease Father     Hypertension Father     No Known Problems Sister     Breast cancer Maternal Aunt 48    Breast cancer Maternal Aunt 36    Breast cancer Maternal Aunt 61     I have reviewed and agree with the history as documented  E-Cigarette/Vaping    E-Cigarette Use Never User      E-Cigarette/Vaping Substances    Nicotine No     THC No     CBD No     Flavoring No     Other No     Unknown No      Social History     Tobacco Use    Smoking status: Never Smoker    Smokeless tobacco: Never Used   Substance Use Topics    Alcohol use: No    Drug use: Not Currently     Comment: medical card         Review of Systems   Constitutional: Negative for chills, diaphoresis and fever  HENT: Negative for hearing loss, sore throat and trouble swallowing  Eyes: Negative for visual disturbance  Respiratory: Negative for cough and shortness of breath      Cardiovascular: Positive for chest pain  Negative for leg swelling  Gastrointestinal: Positive for abdominal pain, nausea and vomiting  Negative for abdominal distention, constipation and diarrhea  Black stool this morning   Endocrine: Negative  Genitourinary: Positive for flank pain  Negative for difficulty urinating and dysuria  Skin: Negative  Allergic/Immunologic: Negative  Neurological: Negative  Negative for weakness and numbness  Hematological: Negative  Psychiatric/Behavioral: Negative  Physical Exam  ED Triage Vitals   Temperature Pulse Respirations Blood Pressure SpO2   08/19/20 1549 08/19/20 1545 08/19/20 1545 08/19/20 1545 08/19/20 1545   98 9 °F (37 2 °C) 79 20 159/91 99 %      Temp Source Heart Rate Source Patient Position - Orthostatic VS BP Location FiO2 (%)   08/19/20 1549 08/19/20 1545 08/19/20 1630 08/19/20 1630 --   Oral Monitor Lying Right arm       Pain Score       08/19/20 1543       7             Orthostatic Vital Signs  Vitals:    08/19/20 1545 08/19/20 1630 08/19/20 1649 08/19/20 1750   BP: 159/91 132/75 132/75 143/83   Pulse: 79 82 72 73   Patient Position - Orthostatic VS:  Lying Lying Lying       Physical Exam  Vitals signs and nursing note reviewed  Constitutional:       General: She is not in acute distress  Appearance: She is well-developed  She is obese  She is not ill-appearing  HENT:      Head: Normocephalic and atraumatic  Mouth/Throat:      Mouth: Mucous membranes are moist       Pharynx: Oropharynx is clear  Eyes:      Extraocular Movements: Extraocular movements intact  Pupils: Pupils are equal, round, and reactive to light  Cardiovascular:      Rate and Rhythm: Normal rate and regular rhythm  Heart sounds: Normal heart sounds  Pulmonary:      Effort: Pulmonary effort is normal  No respiratory distress  Breath sounds: Normal breath sounds  Abdominal:      General: Abdomen is flat   Bowel sounds are normal       Palpations: Abdomen is soft  Tenderness: There is abdominal tenderness in the right upper quadrant and epigastric area  There is right CVA tenderness and left CVA tenderness  Negative signs include Esparza's sign  Skin:     General: Skin is warm and dry  Capillary Refill: Capillary refill takes less than 2 seconds  Neurological:      General: No focal deficit present  Mental Status: She is alert and oriented to person, place, and time     Psychiatric:         Mood and Affect: Mood normal          Behavior: Behavior normal          ED Medications  Medications   ketorolac (TORADOL) injection 15 mg (15 mg Intravenous Given 8/19/20 1746)   ondansetron (ZOFRAN) injection 4 mg (4 mg Intravenous Given 8/19/20 1743)   aluminum-magnesium hydroxide-simethicone (MYLANTA) 200-200-20 mg/5 mL oral suspension 30 mL (30 mL Oral Given 8/19/20 1748)   sucralfate (CARAFATE) oral suspension 1,000 mg (1,000 mg Oral Given 8/19/20 1748)       Diagnostic Studies  Results Reviewed     Procedure Component Value Units Date/Time    Urine Microscopic [[id number]]  (Abnormal) Collected:  08/19/20 1705    Lab Status:  Final result Specimen:  Urine, Clean Catch Updated:  08/19/20 1731     RBC, UA None Seen /hpf      WBC, UA 4-10 /hpf      Epithelial Cells Occasional /hpf      Bacteria, UA None Seen /hpf      Hyaline Casts, UA 5-10 /lpf     UA w Reflex to Microscopic w Reflex to Culture [[id number]]  (Abnormal) Collected:  08/19/20 1705    Lab Status:  Final result Specimen:  Urine, Clean Catch Updated:  08/19/20 1728     Color, UA Dk Yellow     Clarity, UA Clear     Specific [place], UA 1 030     pH, UA 6 0     Leukocytes, UA Small     Nitrite, UA Negative     Protein, UA Trace mg/dl      Glucose, UA Negative mg/dl      Ketones, UA 15 (1+) mg/dl      Urobilinogen, UA 1 0 E U /dl      Bilirubin, UA Interference- unable to analyze     Blood, UA Negative    Troponin I [[id number]]  (Normal) Collected:  08/19/20 1639    Lab Status:  Final result Specimen:  Blood from Arm, Right Updated:  08/19/20 1708     Troponin I <0 02 ng/mL     Comprehensive metabolic panel [[id number]]  (Abnormal) Collected:  08/19/20 1639    Lab Status:  Final result Specimen:  Blood from Arm, Right Updated:  08/19/20 1706     Sodium 140 mmol/L      Potassium 3 9 mmol/L      Chloride 108 mmol/L      CO2 25 mmol/L      ANION GAP 7 mmol/L      BUN 9 mg/dL      Creatinine 1 04 mg/dL      Glucose 85 mg/dL      Calcium 9 0 mg/dL      AST 16 U/L      ALT 27 U/L      Alkaline Phosphatase 147 U/L      Total Protein 7 6 g/dL      Albumin 4 1 g/dL      Total Bilirubin 1 03 mg/dL      eGFR 62 ml/min/1 73sq m     Narrative:       [place] guidelines for Chronic Kidney Disease (CKD):     Stage 1 with normal or high GFR (GFR > 90 mL/min/1 73 square meters)    Stage 2 Mild CKD (GFR = 60-89 mL/min/1 73 square meters)    Stage 3A Moderate CKD (GFR = 45-59 mL/min/1 73 square meters)    Stage 3B Moderate CKD (GFR = 30-44 mL/min/1 73 square meters)    Stage 4 Severe CKD (GFR = 15-29 mL/min/1 73 square meters)    Stage 5 End Stage CKD (GFR <15 mL/min/1 73 square meters)  Note: GFR calculation is accurate only with a steady state creatinine    CBC and differential [[id number]] Collected:  08/19/20 1639    Lab Status:  Final result Specimen:  Blood from Arm, Right Updated:  08/19/20 1658     WBC 6 45 Thousand/uL      RBC 4 88 Million/uL      Hemoglobin 15 3 g/dL      Hematocrit 45 8 %      MCV 94 fL      MCH 31 4 pg      MCHC 33 4 g/dL      RDW 13 2 %      MPV 10 7 fL      Platelets 806 Thousands/uL      nRBC 0 /100 WBCs      Neutrophils Relative 74 %      Immat GRANS % 0 %      Lymphocytes Relative 17 %      Monocytes Relative 7 %      Eosinophils Relative 1 %      Basophils Relative 1 %      Neutrophils Absolute 4 80 Thousands/µL      Immature Grans Absolute 0 02 Thousand/uL      Lymphocytes Absolute 1 12 Thousands/µL      Monocytes Absolute 0 45 Thousand/µL      Eosinophils Absolute 0 03 Thousand/µL      Basophils Absolute 0 03 Thousands/µL                  XR chest 2 views   Final Result by Piedad Diaz DO (08/19 1731)      No acute cardiopulmonary disease  Workstation performed: [id number]               Procedures  Procedures      ED Course                                           MDM  Number of Diagnoses or Management Options  Acute gastritis without hemorrhage, unspecified gastritis type:   Chest wall pain:   Diagnosis management comments:    Patient is 50F presenting with multiple complaints including, chest pain, abdominal pain, n/v, headache, and black stool  Based on the history and physical following differentials considered  - Musculoskeletal, Gastritis, Liver disease, UTI, ACS  Following workup ordered  - CBC, CMP, Urinalysis, EKG, Troponin, CXR  Patient also received Mylanta, Carafate, Toradol and Zofran with significant symptom improvement  Patient was discharged with H2 blockers with the consideration that her symptom was due to gastritis and was given return precaution  Disposition  Final diagnoses:   Acute gastritis without hemorrhage, unspecified gastritis type   Chest wall pain     Time reflects when diagnosis was documented in both MDM as applicable and the Disposition within this note     Time User Action Codes Description Comment    8/19/2020  7:31 PM Cy Candy Add [R07 9] Chest pain, unspecified type     8/19/2020  7:31 PM Cyril, [address] [R07 9] Chest pain, unspecified type     8/19/2020  7:32 PM Cy Candy Add [K29 00] Acute gastritis without hemorrhage, unspecified gastritis type     8/19/2020  7:32 PM Cy Candy Add [R07 89] Chest wall pain       ED Disposition     ED Disposition Condition Date/Time Comment    Discharge Stable Wed Aug 19, 2020  7:31 PM Samuel Portillo discharge to home/self care              Follow-up Information     Follow up With Specialties Details Why Contact Info Additional Information    Grant Braga DO Family Medicine Schedule an appointment as soon as possible for a visit   [address]  [address] Emergency Department Emergency Medicine Go to  If symptoms worsen [address]  [phone]  ED, [address] [place] [address], [address] [phone]          Discharge Medication List as of 8/19/2020  7:36 PM      START taking these medications    Details   famotidine (PEPCID) 20 mg tablet Take 1 tablet (20 mg total) by mouth 2 (two) times a day, Starting Wed 8/19/2020, Normal         CONTINUE these medications which have NOT CHANGED    Details   amitriptyline (ELAVIL) 50 mg tablet Take 1 tablet (50 mg total) by mouth daily at bedtime, Starting Tue 6/30/2020, Until Mon 9/28/2020, Normal      dicyclomine (BENTYL) 10 mg capsule Take 20 mg by mouth as needed  , Historical Med      metoprolol succinate (TOPROL-XL) 25 mg 24 hr tablet Take 1 tablet (25 mg total) by mouth daily, Starting Fri 6/26/2020, Normal      omeprazole (PriLOSEC) 20 mg delayed release capsule Take 20 mg by mouth daily, Starting Mon 6/24/2019, Historical Med      phenazopyridine (PYRIDIUM) 200 mg tablet Take 1 tablet (200 mg total) by mouth 3 (three) times a day as needed for bladder spasms, Starting Thu 7/23/2020, Normal      prazosin (MINIPRESS) 2 mg capsule Take 2 capsules (4 mg total) by mouth daily at bedtime, Starting Tue 6/30/2020, Until Mon 9/28/2020, Normal      SUMAtriptan Succinate (SUMAVEL DOSEPRO) 6 MG/0 5ML SOTJ Inject 6 mg under the skin as needed  , Historical Med           No discharge procedures on file  PDMP Review     None           ED Provider  Attending physically available and evaluated Junior Mccracken I managed the patient along with the ED Attending      Electronically Signed by         Abigail Oliveira MD  08/21/20 [id number]

## 2020-08-20 NOTE — ED ATTENDING ATTESTATION
8/19/2020  I, Mayelin Chung DO, saw and evaluated the patient  I have discussed the patient with the resident/non-physician practitioner and agree with the resident's/non-physician practitioner's findings, Plan of Care, and MDM as documented in the resident's/non-physician practitioner's note, except where noted  All available labs and Radiology studies were reviewed  I was present for key portions of any procedure(s) performed by the resident/non-physician practitioner and I was immediately available to provide assistance  At this point I agree with the current assessment done in the Emergency Department  I have conducted an independent evaluation of this patient a history and physical is as follows:    44 yof with hx of gastric bypass, fibromyalgia presents with a week hx of abd pain, n/v  Epigastric burning pain radiating to right flank  Reports having a dark stool this morning but no blood  Denies bilious vomiting  Hurts to breath  No other assoc sx  Denies f/c  Denies dysuria although had a similar presentation for pyelo before it sounds like? On exam the pt is lying comfortably in bed  Normal vitals  Epigastric tenderness, soft abdomen otherwise, no RUQ tenderness  Negative murphys  A/P: abd pain, likely gastritis, very benign abdomen, also sob but likely secondary to pain, tx for gastritis, cxr for dyspnea r/o efussions, ekg/trop for acs although unlikely   Doubt pe, doubt biliary disease or pancreatitis but will send labs    ED Course         Critical Care Time  Procedures

## 2020-09-18 ENCOUNTER — TELEPHONE (OUTPATIENT)
Dept: NEUROLOGY | Facility: CLINIC | Age: 50
End: 2020-09-18

## 2020-09-18 NOTE — TELEPHONE ENCOUNTER
Tried to call patient again to reschedule appointment  Patient is ok with appointment on 10/6/20 at 11 AM at [place] office  Mailed out appointment reminder card

## 2020-09-18 NOTE — TELEPHONE ENCOUNTER
Left message for patient due to a death in Dr Zeina Knutson family we are currently rescheduling his patients for 9/21/20  Offered patient appointment on 10/6/20 at 11 AM at [facility]  Left call back number [phone]

## 2020-09-21 ENCOUNTER — TELEPHONE (OUTPATIENT)
Dept: FAMILY MEDICINE CLINIC | Facility: CLINIC | Age: 50
End: 2020-09-21

## 2020-09-21 NOTE — TELEPHONE ENCOUNTER
Pt called to schedule TCM  PT was admitted at [facility] on 8/23 and discharged on 9/6 for SIRS   PT scheduled with Eloy Joe on 9/23 at 9:15 am

## 2020-09-21 NOTE — TELEPHONE ENCOUNTER
Please schedule OVL, post hospital  This will not be a TCM since she was discharged 2 weeks ago   Thank you

## 2020-09-26 PROBLEM — I10 ESSENTIAL HYPERTENSION: Status: ACTIVE | Noted: 2020-09-26

## 2020-09-26 PROBLEM — R00.2 PALPITATIONS: Status: ACTIVE | Noted: 2020-09-26

## 2020-09-29 ENCOUNTER — TELEPHONE (OUTPATIENT)
Dept: FAMILY MEDICINE CLINIC | Facility: CLINIC | Age: 50
End: 2020-09-29

## 2020-09-29 ENCOUNTER — OFFICE VISIT (OUTPATIENT)
Dept: CARDIOLOGY CLINIC | Facility: CLINIC | Age: 50
End: 2020-09-29
Payer: COMMERCIAL

## 2020-09-29 VITALS
DIASTOLIC BLOOD PRESSURE: 104 MMHG | SYSTOLIC BLOOD PRESSURE: 141 MMHG | HEART RATE: 89 BPM | TEMPERATURE: 97.9 F | HEIGHT: 63 IN | WEIGHT: 165.4 LBS | BODY MASS INDEX: 29.3 KG/M2

## 2020-09-29 DIAGNOSIS — Q24.8 LEFT VENTRICULAR OUTFLOW OBSTRUCTION: ICD-10-CM

## 2020-09-29 DIAGNOSIS — I10 ESSENTIAL HYPERTENSION: Primary | ICD-10-CM

## 2020-09-29 DIAGNOSIS — R00.2 PALPITATIONS: ICD-10-CM

## 2020-09-29 PROCEDURE — 99214 OFFICE O/P EST MOD 30 MIN: CPT | Performed by: INTERNAL MEDICINE

## 2020-09-29 PROCEDURE — 93000 ELECTROCARDIOGRAM COMPLETE: CPT | Performed by: INTERNAL MEDICINE

## 2020-09-29 RX ORDER — METOPROLOL SUCCINATE 50 MG/1
50 TABLET, EXTENDED RELEASE ORAL DAILY
Qty: 90 TABLET | Refills: 1 | Status: SHIPPED | OUTPATIENT
Start: 2020-09-29

## 2020-09-29 NOTE — PROGRESS NOTES
Cardiology Follow Up    Jf Ceja  1970  [id number]  [address]  [address]  [facility] [address] [phone]    Reason for visit:  Here ahead of time for elevated blood pressure and heart rate  She has a known history of left ventricular outflow tract obstruction not seen on echo in May  Recent admission to [facility] in late August into early September for sepsis due to abdominal abscess  (? Perforated viscus)  1  Essential hypertension  POCT ECG   2  Left ventricular outflow obstruction  POCT ECG   3  Palpitations         Interval History:  Patient was admitted to [facility] in late August into early September for abdominal pain and fever  It turned out she had an intra-abdominal abscess  No surgical intervention other than paracentesis was performed  She was maintained on her usual antihypertensive meds  She was discharged on her alpha-blocker and beta-blocker but has had elevated blood pressures as an outpatient  She has also noticed some elevated heart rates on her Apple watch to as high as 115 beats per minute  She does get  palpitations when her heart rate goes up  She does get some chest tightness when this occurs and does get short of breath when this occurs  She denies exertional chest pain or shortness of breath  She denies peripheral edema    She does get some dizziness at times      Patient Active Problem List   Diagnosis    Synovial plica of left knee    Low iron stores    Elevated alkaline phosphatase level    Syncope and collapse    History of gastric bypass    Rheumatoid arthritis (HCC)    Fibromyalgia    Class 2 obesity due to excess calories in adult    Headache    Left ventricular outflow obstruction    Anemia    Anxiety    Bipolar 1 disorder (HCC)    Depression    Iron deficiency anemia    Liver cyst    Migraine without aura    PTSD (post-traumatic stress disorder)    RLS (restless legs syndrome)    Vitamin B 12 deficiency    Systemic involvement of connective tissue, unspecified (HCC)    Essential hypertension    Palpitations     Past Medical History:   Diagnosis Date    Anxiety     B12 deficiency     Bipolar disorder (HCC)     Fibromyalgia     Fibromyalgia, primary     Hypertension     Iron deficiency anemia     Irritable bowel syndrome     Lupus (HCC)     Migraines     Psychiatric disorder     PTSD (post-traumatic stress disorder)     Rheumatoid arthritis ([facility] [address] )      Social History     Socioeconomic History    Marital status:      Spouse name: Not on file    Number of children: Not on file    Years of education: Not on file    Highest education level: Not on file   Occupational History    Not on file   Social Needs    Financial resource strain: Not on file    Food insecurity     Worry: Not on file     Inability: Not on file    Transportation needs     Medical: Not on file     Non-medical: Not on file   Tobacco Use    Smoking status: Never Smoker    Smokeless tobacco: Never Used   Substance and Sexual Activity    Alcohol use: No    Drug use: Not Currently     Comment: medical card     Sexual activity: Not on file   Lifestyle    Physical activity     Days per week: Not on file     Minutes per session: Not on file    Stress: Not on file   Relationships    Social connections     Talks on phone: Not on file     Gets together: Not on file     Attends [employer] service: Not on file     Active member of club or organization: Not on file     Attends meetings of clubs or organizations: Not on file     Relationship status: Not on file    Intimate partner violence     Fear of current or ex partner: Not on file     Emotionally abused: Not on file     Physically abused: Not on file     Forced sexual activity: Not on file   Other Topics Concern    Not on file   Social History Narrative    Not on file      Family History   Problem Relation Age of Onset    No Known Problems Mother     Heart disease Father     Hypertension Father     No Known Problems Sister     Breast cancer Maternal Aunt 48    Breast cancer Maternal Aunt 36    Breast cancer Maternal Aunt 61     Past Surgical History:   Procedure Laterality Date     SECTION      CHOLECYSTECTOMY      ENDOMETRIAL ABLATION      GASTRIC BYPASS      HYSTERECTOMY      WA KNEE SCOPE,MED/LAT MENISECTOMY Left 2017    Procedure: KNEE DIAGNOSTIC ARTHROSCOPY;  ANTEROMEDIAL PLICA  RESECTION;  Surgeon: Angelica Knight MD;  Location:  MAIN OR;  Service: Orthopedics    TUBAL LIGATION      WISDOM TOOTH EXTRACTION         Current Outpatient Medications:     amitriptyline (ELAVIL) 50 mg tablet, Take 1 tablet (50 mg total) by mouth daily at bedtime, Disp: 90 tablet, Rfl: 1    famotidine (PEPCID) 20 mg tablet, Take 1 tablet (20 mg total) by mouth 2 (two) times a day, Disp: 30 tablet, Rfl: 0    metoprolol succinate (TOPROL-XL) 25 mg 24 hr tablet, Take 1 tablet (25 mg total) by mouth daily, Disp: 90 tablet, Rfl: 1    omeprazole (PriLOSEC) 20 mg delayed release capsule, Take 20 mg by mouth daily, Disp: , Rfl:     prazosin (MINIPRESS) 2 mg capsule, Take 2 capsules (4 mg total) by mouth daily at bedtime, Disp: 180 capsule, Rfl: 1    SUMAtriptan Succinate (SUMAVEL DOSEPRO) 6 MG/0 5ML SOTJ, Inject 6 mg under the skin as needed  , Disp: , Rfl:     dicyclomine (BENTYL) 10 mg capsule, Take 20 mg by mouth as needed  , Disp: , Rfl:     phenazopyridine (PYRIDIUM) 200 mg tablet, Take 1 tablet (200 mg total) by mouth 3 (three) times a day as needed for bladder spasms (Patient not taking: Reported on 2020), Disp: 10 tablet, Rfl: 0  No Known Allergies    Review of Systems:  Review of Systems   Constitutional: Positive for fatigue and unexpected weight change  Negative for activity change and appetite change     Respiratory: Positive for chest tightness and shortness of breath  Negative for cough and wheezing  Cardiovascular: Positive for palpitations  Negative for chest pain and leg swelling  Gastrointestinal: Positive for abdominal pain  Negative for blood in stool, constipation and diarrhea  Genitourinary: Negative for dysuria, frequency, hematuria and urgency  Musculoskeletal: Positive for arthralgias  Negative for back pain, gait problem and joint swelling  Neurological: Positive for dizziness and headaches  Negative for speech difficulty and light-headedness  Psychiatric/Behavioral: Negative for agitation, behavioral problems, confusion and decreased concentration  Physical Exam:  Vitals:    09/29/20 1631   BP: (!) 141/104   Pulse: 89   Temp: 97 9 °F (36 6 °C)   Weight: 75 kg (165 lb 6 4 oz)   Height: 5' 3" (1 6 m)       Physical Exam  Constitutional:       General: She is not in acute distress  Appearance: She is obese  She is not ill-appearing  HENT:      Head: Normocephalic and atraumatic  Eyes:      General: No scleral icterus  Conjunctiva/sclera: Conjunctivae normal    Neck:      Musculoskeletal: Neck supple  Thyroid: No thyroid mass or thyromegaly  Vascular: Normal carotid pulses  No carotid bruit or JVD  Cardiovascular:      Rate and Rhythm: Normal rate  Pulses: Normal pulses  Heart sounds: No murmur  No friction rub  No gallop  Pulmonary:      Breath sounds: Normal breath sounds  No wheezing, rhonchi or rales  Abdominal:      Palpations: Abdomen is soft  There is no hepatomegaly, splenomegaly or mass  Tenderness: There is no abdominal tenderness  Musculoskeletal:         General: No swelling or deformity  Right lower leg: No edema  Left lower leg: No edema  Skin:     General: Skin is warm and dry  Coloration: Skin is not jaundiced or pale  Findings: No bruising, erythema, lesion or rash  Neurological:      Mental Status: She is alert and oriented to person, place, and time  Cranial Nerves: No cranial nerve deficit  Sensory: No sensory deficit  Motor: No weakness  Gait: Gait normal    Psychiatric:         Mood and Affect: Mood normal          Behavior: Behavior normal          Thought Content: Thought content normal          Judgment: Judgment normal          Discussion/Summary:  1  HTN-interestingly blood pressure is elevated when it was generally well controlled on alpha-blocker and beta-blocker  At this point will increase metoprolol XL to 50 mg daily  This will help with the patient's palpitations as well  See below  2  Left ventricular outflow tract obstruction  Seen when patient was volume depleted  Not evident on echocardiogram in May  Continue beta-blocker  3  Palpitations  Patient has Apple watch and showed me multiple strips which appear to be all sinus tachycardia  Doubt pathologic tachycardia  Will increase beta-blocker  Feel anxiety after rather serious illnesses playing a role in elevating her heart rate          FU 2 months    Ada Shelby MD

## 2020-10-01 ENCOUNTER — OFFICE VISIT (OUTPATIENT)
Dept: FAMILY MEDICINE CLINIC | Facility: CLINIC | Age: 50
End: 2020-10-01
Payer: COMMERCIAL

## 2020-10-01 VITALS
HEART RATE: 88 BPM | WEIGHT: 164.2 LBS | OXYGEN SATURATION: 98 % | DIASTOLIC BLOOD PRESSURE: 88 MMHG | RESPIRATION RATE: 17 BRPM | BODY MASS INDEX: 28.03 KG/M2 | TEMPERATURE: 98.2 F | SYSTOLIC BLOOD PRESSURE: 126 MMHG | HEIGHT: 64 IN

## 2020-10-01 DIAGNOSIS — R10.84 GENERALIZED ABDOMINAL PAIN: Primary | ICD-10-CM

## 2020-10-01 DIAGNOSIS — R11.0 NAUSEA: ICD-10-CM

## 2020-10-01 DIAGNOSIS — R14.0 BLOATING: ICD-10-CM

## 2020-10-01 DIAGNOSIS — F41.9 ANXIETY: ICD-10-CM

## 2020-10-01 PROCEDURE — 99214 OFFICE O/P EST MOD 30 MIN: CPT | Performed by: FAMILY MEDICINE

## 2020-10-01 RX ORDER — ONDANSETRON 4 MG/1
4 TABLET, FILM COATED ORAL EVERY 8 HOURS PRN
Qty: 20 TABLET | Refills: 0 | Status: SHIPPED | OUTPATIENT
Start: 2020-10-01

## 2020-10-01 RX ORDER — ESCITALOPRAM OXALATE 10 MG/1
10 TABLET ORAL DAILY
Qty: 30 TABLET | Refills: 1 | Status: SHIPPED | OUTPATIENT
Start: 2020-10-01

## 2020-10-01 RX ORDER — OMEPRAZOLE 40 MG/1
40 CAPSULE, DELAYED RELEASE ORAL
Qty: 30 CAPSULE | Refills: 5 | Status: SHIPPED | OUTPATIENT
Start: 2020-10-01

## 2020-10-01 RX ORDER — ALPRAZOLAM 0.25 MG/1
0.25 TABLET ORAL 2 TIMES DAILY PRN
Qty: 20 TABLET | Refills: 0 | Status: SHIPPED | OUTPATIENT
Start: 2020-10-01

## 2020-10-05 ENCOUNTER — TELEPHONE (OUTPATIENT)
Dept: FAMILY MEDICINE CLINIC | Facility: CLINIC | Age: 50
End: 2020-10-05

## 2020-10-06 ENCOUNTER — TELEPHONE (OUTPATIENT)
Dept: CARDIOLOGY CLINIC | Facility: CLINIC | Age: 50
End: 2020-10-06

## 2020-10-07 ENCOUNTER — TELEPHONE (OUTPATIENT)
Dept: FAMILY MEDICINE CLINIC | Facility: CLINIC | Age: 50
End: 2020-10-07

## 2020-10-08 ENCOUNTER — HOSPITAL ENCOUNTER (OUTPATIENT)
Dept: RADIOLOGY | Facility: HOSPITAL | Age: 50
Discharge: HOME | End: 2020-10-08
Payer: COMMERCIAL

## 2020-10-08 ENCOUNTER — TRANSCRIBE ORDERS (OUTPATIENT)
Dept: RADIOLOGY | Facility: HOSPITAL | Age: 50
End: 2020-10-08

## 2020-10-08 ENCOUNTER — TELEPHONE (OUTPATIENT)
Dept: FAMILY MEDICINE CLINIC | Facility: CLINIC | Age: 50
End: 2020-10-08

## 2020-10-08 DIAGNOSIS — R10.84 GENERALIZED ABDOMINAL PAIN: ICD-10-CM

## 2020-10-08 PROCEDURE — 74177 CT ABD & PELVIS W/CONTRAST: CPT

## 2020-10-08 PROCEDURE — G1004 CDSM NDSC: HCPCS

## 2020-10-08 RX ADMIN — IOHEXOL 100 ML: 350 INJECTION, SOLUTION INTRAVENOUS at 14:47

## 2020-10-09 ENCOUNTER — TELEPHONE (OUTPATIENT)
Dept: FAMILY MEDICINE CLINIC | Facility: CLINIC | Age: 50
End: 2020-10-09

## 2020-10-13 ENCOUNTER — OFFICE VISIT (OUTPATIENT)
Dept: GASTROENTEROLOGY | Facility: MEDICAL CENTER | Age: 50
End: 2020-10-13
Payer: COMMERCIAL

## 2020-10-13 VITALS
DIASTOLIC BLOOD PRESSURE: 82 MMHG | SYSTOLIC BLOOD PRESSURE: 116 MMHG | TEMPERATURE: 97.6 F | BODY MASS INDEX: 28.08 KG/M2 | WEIGHT: 163.6 LBS | HEART RATE: 69 BPM

## 2020-10-13 DIAGNOSIS — Z98.84 GASTRIC BYPASS STATUS FOR OBESITY: ICD-10-CM

## 2020-10-13 DIAGNOSIS — K65.9 PERITONITIS (HCC): ICD-10-CM

## 2020-10-13 DIAGNOSIS — L02.91 ABSCESS: ICD-10-CM

## 2020-10-13 DIAGNOSIS — R10.84 GENERALIZED ABDOMINAL PAIN: ICD-10-CM

## 2020-10-13 DIAGNOSIS — K52.9 ENTERITIS: Primary | ICD-10-CM

## 2020-10-13 PROCEDURE — 99204 OFFICE O/P NEW MOD 45 MIN: CPT | Performed by: INTERNAL MEDICINE

## 2020-10-13 PROCEDURE — 1036F TOBACCO NON-USER: CPT | Performed by: INTERNAL MEDICINE

## 2020-10-13 PROCEDURE — 3079F DIAST BP 80-89 MM HG: CPT | Performed by: INTERNAL MEDICINE

## 2020-10-20 ENCOUNTER — APPOINTMENT (EMERGENCY)
Dept: RADIOLOGY | Facility: HOSPITAL | Age: 50
End: 2020-10-20
Payer: COMMERCIAL

## 2020-10-20 ENCOUNTER — HOSPITAL ENCOUNTER (EMERGENCY)
Facility: HOSPITAL | Age: 50
Discharge: ANOTHER HOSPITAL | End: 2020-10-20
Attending: EMERGENCY MEDICINE | Admitting: EMERGENCY MEDICINE
Payer: COMMERCIAL

## 2020-10-20 ENCOUNTER — HOSPITAL ENCOUNTER (OUTPATIENT)
Dept: MRI IMAGING | Facility: HOSPITAL | Age: 50
Discharge: HOME | End: 2020-10-20
Attending: INTERNAL MEDICINE

## 2020-10-20 VITALS
HEIGHT: 62 IN | WEIGHT: 165 LBS | OXYGEN SATURATION: 94 % | DIASTOLIC BLOOD PRESSURE: 77 MMHG | HEART RATE: 90 BPM | RESPIRATION RATE: 18 BRPM | BODY MASS INDEX: 30.36 KG/M2 | SYSTOLIC BLOOD PRESSURE: 158 MMHG | TEMPERATURE: 97.8 F

## 2020-10-20 DIAGNOSIS — K56.600 PARTIAL BOWEL OBSTRUCTION (HCC): Primary | ICD-10-CM

## 2020-10-20 DIAGNOSIS — R11.2 NAUSEA AND VOMITING: ICD-10-CM

## 2020-10-20 DIAGNOSIS — R10.9 ABDOMINAL PAIN: ICD-10-CM

## 2020-10-20 LAB
ALBUMIN SERPL BCP-MCNC: 4 G/DL (ref 3.5–5)
ALP SERPL-CCNC: 174 U/L (ref 46–116)
ALT SERPL W P-5'-P-CCNC: 19 U/L (ref 12–78)
ANION GAP SERPL CALCULATED.3IONS-SCNC: 11 MMOL/L (ref 4–13)
AST SERPL W P-5'-P-CCNC: 29 U/L (ref 5–45)
BACTERIA UR QL AUTO: ABNORMAL /HPF
BASOPHILS # BLD AUTO: 0.02 THOUSANDS/ΜL (ref 0–0.1)
BASOPHILS NFR BLD AUTO: 0 % (ref 0–1)
BILIRUB SERPL-MCNC: 1.01 MG/DL (ref 0.2–1)
BILIRUB UR QL STRIP: ABNORMAL
BUN SERPL-MCNC: 10 MG/DL (ref 5–25)
CALCIUM SERPL-MCNC: 9.1 MG/DL (ref 8.3–10.1)
CHLORIDE SERPL-SCNC: 98 MMOL/L (ref 100–108)
CLARITY UR: CLEAR
CO2 SERPL-SCNC: 22 MMOL/L (ref 21–32)
COLOR UR: YELLOW
COLOR, POC: YELLOW
CREAT SERPL-MCNC: 0.8 MG/DL (ref 0.6–1.3)
EOSINOPHIL # BLD AUTO: 0.04 THOUSAND/ΜL (ref 0–0.61)
EOSINOPHIL NFR BLD AUTO: 1 % (ref 0–6)
ERYTHROCYTE [DISTWIDTH] IN BLOOD BY AUTOMATED COUNT: 13 % (ref 11.6–15.1)
GFR SERPL CREATININE-BSD FRML MDRD: 85 ML/MIN/1.73SQ M
GLUCOSE SERPL-MCNC: 85 MG/DL (ref 65–140)
GLUCOSE UR STRIP-MCNC: NEGATIVE MG/DL
HCT VFR BLD AUTO: 47 % (ref 34.8–46.1)
HGB BLD-MCNC: 16.2 G/DL (ref 11.5–15.4)
HGB UR QL STRIP.AUTO: NEGATIVE
IMM GRANULOCYTES # BLD AUTO: 0.01 THOUSAND/UL (ref 0–0.2)
IMM GRANULOCYTES NFR BLD AUTO: 0 % (ref 0–2)
KETONES UR STRIP-MCNC: ABNORMAL MG/DL
LEUKOCYTE ESTERASE UR QL STRIP: NEGATIVE
LIPASE SERPL-CCNC: 563 U/L (ref 73–393)
LYMPHOCYTES # BLD AUTO: 1.3 THOUSANDS/ΜL (ref 0.6–4.47)
LYMPHOCYTES NFR BLD AUTO: 20 % (ref 14–44)
MCH RBC QN AUTO: 31.5 PG (ref 26.8–34.3)
MCHC RBC AUTO-ENTMCNC: 34.5 G/DL (ref 31.4–37.4)
MCV RBC AUTO: 91 FL (ref 82–98)
MONOCYTES # BLD AUTO: 0.4 THOUSAND/ΜL (ref 0.17–1.22)
MONOCYTES NFR BLD AUTO: 6 % (ref 4–12)
NEUTROPHILS # BLD AUTO: 4.64 THOUSANDS/ΜL (ref 1.85–7.62)
NEUTS SEG NFR BLD AUTO: 73 % (ref 43–75)
NITRITE UR QL STRIP: NEGATIVE
NON-SQ EPI CELLS URNS QL MICRO: ABNORMAL /HPF
NRBC BLD AUTO-RTO: 0 /100 WBCS
PH UR STRIP.AUTO: 6 [PH] (ref 4.5–8)
PLATELET # BLD AUTO: 381 THOUSANDS/UL (ref 149–390)
PMV BLD AUTO: 10 FL (ref 8.9–12.7)
POTASSIUM SERPL-SCNC: 4.7 MMOL/L (ref 3.5–5.3)
PROT SERPL-MCNC: 8 G/DL (ref 6.4–8.2)
PROT UR STRIP-MCNC: ABNORMAL MG/DL
RBC # BLD AUTO: 5.15 MILLION/UL (ref 3.81–5.12)
RBC #/AREA URNS AUTO: ABNORMAL /HPF
SODIUM SERPL-SCNC: 131 MMOL/L (ref 136–145)
SP GR UR STRIP.AUTO: >=1.03 (ref 1–1.03)
UROBILINOGEN UR QL STRIP.AUTO: 2 E.U./DL
WBC # BLD AUTO: 6.41 THOUSAND/UL (ref 4.31–10.16)
WBC #/AREA URNS AUTO: ABNORMAL /HPF

## 2020-10-20 PROCEDURE — 74177 CT ABD & PELVIS W/CONTRAST: CPT

## 2020-10-20 PROCEDURE — G1004 CDSM NDSC: HCPCS

## 2020-10-20 PROCEDURE — 96361 HYDRATE IV INFUSION ADD-ON: CPT

## 2020-10-20 PROCEDURE — 85025 COMPLETE CBC W/AUTO DIFF WBC: CPT | Performed by: EMERGENCY MEDICINE

## 2020-10-20 PROCEDURE — 99285 EMERGENCY DEPT VISIT HI MDM: CPT

## 2020-10-20 PROCEDURE — 96374 THER/PROPH/DIAG INJ IV PUSH: CPT

## 2020-10-20 PROCEDURE — 83690 ASSAY OF LIPASE: CPT | Performed by: EMERGENCY MEDICINE

## 2020-10-20 PROCEDURE — 81001 URINALYSIS AUTO W/SCOPE: CPT

## 2020-10-20 PROCEDURE — 96376 TX/PRO/DX INJ SAME DRUG ADON: CPT

## 2020-10-20 PROCEDURE — 36415 COLL VENOUS BLD VENIPUNCTURE: CPT

## 2020-10-20 PROCEDURE — 99285 EMERGENCY DEPT VISIT HI MDM: CPT | Performed by: EMERGENCY MEDICINE

## 2020-10-20 PROCEDURE — 96375 TX/PRO/DX INJ NEW DRUG ADDON: CPT

## 2020-10-20 PROCEDURE — 80053 COMPREHEN METABOLIC PANEL: CPT | Performed by: EMERGENCY MEDICINE

## 2020-10-20 RX ORDER — ONDANSETRON 2 MG/ML
4 INJECTION INTRAMUSCULAR; INTRAVENOUS ONCE
Status: COMPLETED | OUTPATIENT
Start: 2020-10-20 | End: 2020-10-20

## 2020-10-20 RX ORDER — METOCLOPRAMIDE HYDROCHLORIDE 5 MG/ML
10 INJECTION INTRAMUSCULAR; INTRAVENOUS ONCE
Status: COMPLETED | OUTPATIENT
Start: 2020-10-20 | End: 2020-10-20

## 2020-10-20 RX ORDER — MORPHINE SULFATE 4 MG/ML
4 INJECTION, SOLUTION INTRAMUSCULAR; INTRAVENOUS ONCE
Status: COMPLETED | OUTPATIENT
Start: 2020-10-20 | End: 2020-10-20

## 2020-10-20 RX ORDER — HYDROMORPHONE HCL/PF 1 MG/ML
1 SYRINGE (ML) INJECTION ONCE
Status: COMPLETED | OUTPATIENT
Start: 2020-10-20 | End: 2020-10-20

## 2020-10-20 RX ADMIN — METOCLOPRAMIDE 10 MG: 5 INJECTION, SOLUTION INTRAMUSCULAR; INTRAVENOUS at 21:31

## 2020-10-20 RX ADMIN — MORPHINE SULFATE 4 MG: 4 INJECTION INTRAVENOUS at 15:57

## 2020-10-20 RX ADMIN — ONDANSETRON 4 MG: 2 INJECTION INTRAMUSCULAR; INTRAVENOUS at 18:19

## 2020-10-20 RX ADMIN — IOHEXOL 50 ML: 240 INJECTION, SOLUTION INTRATHECAL; INTRAVASCULAR; INTRAVENOUS; ORAL at 12:32

## 2020-10-20 RX ADMIN — MORPHINE SULFATE 4 MG: 4 INJECTION INTRAVENOUS at 18:20

## 2020-10-20 RX ADMIN — ONDANSETRON 4 MG: 2 INJECTION INTRAMUSCULAR; INTRAVENOUS at 15:57

## 2020-10-20 RX ADMIN — ONDANSETRON 4 MG: 2 INJECTION INTRAMUSCULAR; INTRAVENOUS at 12:32

## 2020-10-20 RX ADMIN — SODIUM CHLORIDE 1000 ML: 0.9 INJECTION, SOLUTION INTRAVENOUS at 12:32

## 2020-10-20 RX ADMIN — HYDROMORPHONE HYDROCHLORIDE 1 MG: 1 INJECTION, SOLUTION INTRAMUSCULAR; INTRAVENOUS; SUBCUTANEOUS at 21:24

## 2020-10-23 DIAGNOSIS — Z98.84 STATUS POST BARIATRIC SURGERY: Primary | ICD-10-CM

## 2020-11-12 ENCOUNTER — TELEPHONE (OUTPATIENT)
Dept: FAMILY MEDICINE CLINIC | Facility: CLINIC | Age: 50
End: 2020-11-12

## 2020-12-11 ENCOUNTER — VBI (OUTPATIENT)
Dept: ADMINISTRATIVE | Facility: OTHER | Age: 50
End: 2020-12-11

## 2020-12-17 DIAGNOSIS — F31.9 BIPOLAR 1 DISORDER (HCC): ICD-10-CM

## 2020-12-17 DIAGNOSIS — M79.7 FIBROMYALGIA: ICD-10-CM

## 2020-12-22 ENCOUNTER — TELEPHONE (OUTPATIENT)
Dept: FAMILY MEDICINE CLINIC | Facility: CLINIC | Age: 50
End: 2020-12-22

## 2021-01-15 ENCOUNTER — TELEPHONE (OUTPATIENT)
Dept: FAMILY MEDICINE CLINIC | Facility: CLINIC | Age: 51
End: 2021-01-15

## 2021-01-19 RX ORDER — AMITRIPTYLINE HYDROCHLORIDE 50 MG/1
TABLET, FILM COATED ORAL
Qty: 90 TABLET | Refills: 1 | OUTPATIENT
Start: 2021-01-19

## 2021-01-19 RX ORDER — METOPROLOL SUCCINATE 25 MG/1
TABLET, EXTENDED RELEASE ORAL
Qty: 90 TABLET | OUTPATIENT
Start: 2021-01-19

## 2021-01-19 RX ORDER — PRAZOSIN HYDROCHLORIDE 2 MG/1
CAPSULE ORAL
Qty: 180 CAPSULE | Refills: 1 | OUTPATIENT
Start: 2021-01-19

## (undated) DEVICE — SYRINGE 3ML LL

## (undated) DEVICE — VIAL DECANTER

## (undated) DEVICE — TUBING ARTHROSCOPIC WAVE  MAIN PUMP

## (undated) DEVICE — GLOVE SRG BIOGEL ORTHOPEDIC 7.5

## (undated) DEVICE — 2000CC GUARDIAN II: Brand: GUARDIAN

## (undated) DEVICE — NEEDLE 18 G X 1 1/2

## (undated) DEVICE — GLOVE INDICATOR PI UNDERGLOVE SZ 7 BLUE

## (undated) DEVICE — SUT ETHILON 3-0 PS-1 18 IN 1663H

## (undated) DEVICE — NEEDLE HYPO 22G X 1-1/2 IN

## (undated) DEVICE — ACE WRAP 6 IN UNSTERILE

## (undated) DEVICE — INTENDED FOR TISSUE SEPARATION, AND OTHER PROCEDURES THAT REQUIRE A SHARP SURGICAL BLADE TO PUNCTURE OR CUT.: Brand: BARD-PARKER SAFETY BLADES SIZE 11, STERILE

## (undated) DEVICE — CUFF TOURNIQUET 34 X 4 IN QUICK CONNECT DISP 1BLA

## (undated) DEVICE — CHLORAPREP HI-LITE 26ML ORANGE

## (undated) DEVICE — 3000CC GUARDIAN II: Brand: GUARDIAN

## (undated) DEVICE — CAST PADDING 6 IN STERILE

## (undated) DEVICE — GLOVE SRG BIOGEL 8

## (undated) DEVICE — GLOVE INDICATOR PI UNDERGLOVE SZ 8.5 BLUE

## (undated) DEVICE — SYRINGE 30ML LL

## (undated) DEVICE — GLOVE SRG BIOGEL 7

## (undated) DEVICE — BLADE SHAVER EXCALIBUR 4MM 13CM COOLCUT

## (undated) DEVICE — TUBING SUCTION 5MM X 12 FT

## (undated) DEVICE — GLOVE INDICATOR PI UNDERGLOVE SZ 8 BLUE

## (undated) DEVICE — PACK UNIVERSAL ARTHRSCOPY PBDS

## (undated) DEVICE — IMPERVIOUS STOCKINETTE: Brand: DEROYAL

## (undated) DEVICE — GLOVE SRG BIOGEL 7.5

## (undated) DEVICE — FILTER STRAW 1.7

## (undated) DEVICE — OCCLUSIVE GAUZE STRIP,3% BISMUTH TRIBROMOPHENATE IN PETROLATUM BLEND: Brand: XEROFORM